# Patient Record
Sex: MALE | Race: WHITE | NOT HISPANIC OR LATINO
[De-identification: names, ages, dates, MRNs, and addresses within clinical notes are randomized per-mention and may not be internally consistent; named-entity substitution may affect disease eponyms.]

---

## 2017-02-28 ENCOUNTER — MEDICATION RENEWAL (OUTPATIENT)
Age: 42
End: 2017-02-28

## 2017-05-12 ENCOUNTER — MESSAGE (OUTPATIENT)
Age: 42
End: 2017-05-12

## 2017-06-06 ENCOUNTER — OTHER (OUTPATIENT)
Age: 42
End: 2017-06-06

## 2017-09-14 ENCOUNTER — APPOINTMENT (OUTPATIENT)
Dept: FAMILY MEDICINE | Facility: CLINIC | Age: 42
End: 2017-09-14
Payer: COMMERCIAL

## 2017-09-14 VITALS
BODY MASS INDEX: 31.22 KG/M2 | DIASTOLIC BLOOD PRESSURE: 80 MMHG | OXYGEN SATURATION: 98 % | RESPIRATION RATE: 16 BRPM | WEIGHT: 206 LBS | HEIGHT: 68 IN | SYSTOLIC BLOOD PRESSURE: 120 MMHG | HEART RATE: 87 BPM

## 2017-09-14 DIAGNOSIS — T81.89XA OTHER COMPLICATIONS OF PROCEDURES, NOT ELSEWHERE CLASSIFIED, INITIAL ENCOUNTER: ICD-10-CM

## 2017-09-14 DIAGNOSIS — M54.2 CERVICALGIA: ICD-10-CM

## 2017-09-14 DIAGNOSIS — Z18.9 OTHER COMPLICATIONS OF PROCEDURES, NOT ELSEWHERE CLASSIFIED, INITIAL ENCOUNTER: ICD-10-CM

## 2017-09-14 PROCEDURE — 90686 IIV4 VACC NO PRSV 0.5 ML IM: CPT

## 2017-09-14 PROCEDURE — G0008: CPT

## 2017-09-25 ENCOUNTER — RX RENEWAL (OUTPATIENT)
Age: 42
End: 2017-09-25

## 2018-02-26 ENCOUNTER — OTHER (OUTPATIENT)
Age: 43
End: 2018-02-26

## 2018-02-26 DIAGNOSIS — R11.0 NAUSEA: ICD-10-CM

## 2018-03-20 ENCOUNTER — APPOINTMENT (OUTPATIENT)
Dept: FAMILY MEDICINE | Facility: CLINIC | Age: 43
End: 2018-03-20
Payer: COMMERCIAL

## 2018-03-20 VITALS
WEIGHT: 207 LBS | SYSTOLIC BLOOD PRESSURE: 140 MMHG | BODY MASS INDEX: 31.37 KG/M2 | OXYGEN SATURATION: 98 % | RESPIRATION RATE: 16 BRPM | HEART RATE: 99 BPM | DIASTOLIC BLOOD PRESSURE: 80 MMHG | HEIGHT: 68 IN

## 2018-03-20 PROCEDURE — 99214 OFFICE O/P EST MOD 30 MIN: CPT

## 2018-07-30 ENCOUNTER — RX RENEWAL (OUTPATIENT)
Age: 43
End: 2018-07-30

## 2018-08-14 ENCOUNTER — MESSAGE (OUTPATIENT)
Age: 43
End: 2018-08-14

## 2018-08-17 ENCOUNTER — APPOINTMENT (OUTPATIENT)
Dept: CARDIOLOGY | Facility: CLINIC | Age: 43
End: 2018-08-17
Payer: COMMERCIAL

## 2018-08-17 ENCOUNTER — NON-APPOINTMENT (OUTPATIENT)
Age: 43
End: 2018-08-17

## 2018-08-17 VITALS
OXYGEN SATURATION: 98 % | HEIGHT: 68 IN | BODY MASS INDEX: 31.37 KG/M2 | DIASTOLIC BLOOD PRESSURE: 80 MMHG | SYSTOLIC BLOOD PRESSURE: 117 MMHG | HEART RATE: 89 BPM | WEIGHT: 207 LBS

## 2018-08-17 PROCEDURE — 93000 ELECTROCARDIOGRAM COMPLETE: CPT

## 2018-08-17 PROCEDURE — 99204 OFFICE O/P NEW MOD 45 MIN: CPT | Mod: 25

## 2018-08-20 ENCOUNTER — APPOINTMENT (OUTPATIENT)
Dept: FAMILY MEDICINE | Facility: CLINIC | Age: 43
End: 2018-08-20
Payer: COMMERCIAL

## 2018-08-20 VITALS
TEMPERATURE: 98 F | BODY MASS INDEX: 31.07 KG/M2 | DIASTOLIC BLOOD PRESSURE: 60 MMHG | SYSTOLIC BLOOD PRESSURE: 130 MMHG | HEART RATE: 98 BPM | HEIGHT: 68 IN | WEIGHT: 205 LBS | OXYGEN SATURATION: 98 %

## 2018-08-20 DIAGNOSIS — M54.6 PAIN IN THORACIC SPINE: ICD-10-CM

## 2018-08-20 PROCEDURE — 36415 COLL VENOUS BLD VENIPUNCTURE: CPT

## 2018-08-20 PROCEDURE — 99214 OFFICE O/P EST MOD 30 MIN: CPT | Mod: 25

## 2018-08-20 RX ORDER — ONDANSETRON 4 MG/1
4 TABLET, ORALLY DISINTEGRATING ORAL
Qty: 12 | Refills: 2 | Status: DISCONTINUED | COMMUNITY
Start: 2018-02-26 | End: 2018-08-20

## 2018-08-20 RX ORDER — ADALIMUMAB 40MG/0.8ML
40 KIT SUBCUTANEOUS
Qty: 2 | Refills: 0 | Status: ACTIVE | COMMUNITY
Start: 2018-06-14

## 2018-08-21 LAB
MEV IGG FLD QL IA: 23 AU/ML
MEV IGG+IGM SER-IMP: NEGATIVE
MUV AB SER-ACNC: NEGATIVE
MUV IGG SER QL IA: <5 AU/ML
RUBV IGG FLD-ACNC: >33 INDEX
RUBV IGG SER-IMP: POSITIVE

## 2018-08-24 ENCOUNTER — RX RENEWAL (OUTPATIENT)
Age: 43
End: 2018-08-24

## 2018-08-24 ENCOUNTER — MEDICATION RENEWAL (OUTPATIENT)
Age: 43
End: 2018-08-24

## 2018-08-29 PROBLEM — Z00.00 ENCOUNTER FOR PREVENTIVE HEALTH EXAMINATION: Noted: 2018-08-29

## 2018-09-11 ENCOUNTER — RECORD ABSTRACTING (OUTPATIENT)
Age: 43
End: 2018-09-11

## 2018-09-11 ENCOUNTER — APPOINTMENT (OUTPATIENT)
Dept: ENDOCRINOLOGY | Facility: CLINIC | Age: 43
End: 2018-09-11
Payer: COMMERCIAL

## 2018-09-11 ENCOUNTER — RX RENEWAL (OUTPATIENT)
Age: 43
End: 2018-09-11

## 2018-09-11 VITALS
DIASTOLIC BLOOD PRESSURE: 80 MMHG | HEART RATE: 84 BPM | BODY MASS INDEX: 31.37 KG/M2 | WEIGHT: 207 LBS | SYSTOLIC BLOOD PRESSURE: 130 MMHG | HEIGHT: 68 IN

## 2018-09-11 DIAGNOSIS — Z78.9 OTHER SPECIFIED HEALTH STATUS: ICD-10-CM

## 2018-09-11 PROBLEM — M54.6 ACUTE LEFT-SIDED THORACIC BACK PAIN: Status: ACTIVE | Noted: 2017-05-12

## 2018-09-11 LAB — GLUCOSE BLDC GLUCOMTR-MCNC: 188

## 2018-09-11 PROCEDURE — 99214 OFFICE O/P EST MOD 30 MIN: CPT | Mod: 25

## 2018-09-11 PROCEDURE — 82962 GLUCOSE BLOOD TEST: CPT

## 2018-09-13 ENCOUNTER — APPOINTMENT (OUTPATIENT)
Dept: ENDOCRINOLOGY | Facility: CLINIC | Age: 43
End: 2018-09-13

## 2018-09-14 ENCOUNTER — APPOINTMENT (OUTPATIENT)
Dept: CARDIOLOGY | Facility: CLINIC | Age: 43
End: 2018-09-14
Payer: COMMERCIAL

## 2018-09-14 ENCOUNTER — RX RENEWAL (OUTPATIENT)
Age: 43
End: 2018-09-14

## 2018-09-14 VITALS
WEIGHT: 207 LBS | BODY MASS INDEX: 31.37 KG/M2 | SYSTOLIC BLOOD PRESSURE: 140 MMHG | DIASTOLIC BLOOD PRESSURE: 90 MMHG | HEIGHT: 68 IN

## 2018-09-14 PROCEDURE — 99213 OFFICE O/P EST LOW 20 MIN: CPT | Mod: 25

## 2018-09-14 PROCEDURE — 93015 CV STRESS TEST SUPVJ I&R: CPT

## 2018-09-14 PROCEDURE — 93306 TTE W/DOPPLER COMPLETE: CPT

## 2018-09-25 ENCOUNTER — APPOINTMENT (OUTPATIENT)
Dept: FAMILY MEDICINE | Facility: CLINIC | Age: 43
End: 2018-09-25

## 2018-10-24 ENCOUNTER — APPOINTMENT (OUTPATIENT)
Dept: FAMILY MEDICINE | Facility: CLINIC | Age: 43
End: 2018-10-24
Payer: COMMERCIAL

## 2018-10-24 DIAGNOSIS — Z71.89 OTHER SPECIFIED COUNSELING: ICD-10-CM

## 2018-10-24 DIAGNOSIS — Z23 ENCOUNTER FOR IMMUNIZATION: ICD-10-CM

## 2018-10-24 PROCEDURE — 90472 IMMUNIZATION ADMIN EACH ADD: CPT

## 2018-10-24 PROCEDURE — G0008: CPT

## 2018-10-24 PROCEDURE — 90471 IMMUNIZATION ADMIN: CPT

## 2018-10-24 PROCEDURE — 99214 OFFICE O/P EST MOD 30 MIN: CPT | Mod: 25

## 2018-10-24 PROCEDURE — 90686 IIV4 VACC NO PRSV 0.5 ML IM: CPT

## 2018-10-24 PROCEDURE — 90707 MMR VACCINE SC: CPT

## 2018-10-28 VITALS
RESPIRATION RATE: 18 BRPM | OXYGEN SATURATION: 98 % | TEMPERATURE: 98 F | DIASTOLIC BLOOD PRESSURE: 84 MMHG | WEIGHT: 207 LBS | HEART RATE: 80 BPM | BODY MASS INDEX: 31.37 KG/M2 | SYSTOLIC BLOOD PRESSURE: 136 MMHG | HEIGHT: 68 IN

## 2018-10-28 PROBLEM — Z23 ENCOUNTER FOR VACCINATION: Status: ACTIVE | Noted: 2017-09-14

## 2018-10-28 PROBLEM — Z71.89 VACCINE COUNSELING: Status: ACTIVE | Noted: 2017-09-14

## 2018-11-08 ENCOUNTER — APPOINTMENT (OUTPATIENT)
Dept: OPHTHALMOLOGY | Facility: CLINIC | Age: 43
End: 2018-11-08
Payer: COMMERCIAL

## 2018-11-08 DIAGNOSIS — H01.02B SQUAMOUS BLEPHARITIS RIGHT EYE, UPPER AND LOWER EYELIDS: ICD-10-CM

## 2018-11-08 DIAGNOSIS — H01.02A SQUAMOUS BLEPHARITIS RIGHT EYE, UPPER AND LOWER EYELIDS: ICD-10-CM

## 2018-11-08 PROCEDURE — 92004 COMPRE OPH EXAM NEW PT 1/>: CPT

## 2019-01-08 ENCOUNTER — RECORD ABSTRACTING (OUTPATIENT)
Age: 44
End: 2019-01-08

## 2019-01-08 DIAGNOSIS — Z78.9 OTHER SPECIFIED HEALTH STATUS: ICD-10-CM

## 2019-01-16 ENCOUNTER — APPOINTMENT (OUTPATIENT)
Dept: ENDOCRINOLOGY | Facility: CLINIC | Age: 44
End: 2019-01-16
Payer: COMMERCIAL

## 2019-01-16 LAB — GLUCOSE BLDC GLUCOMTR-MCNC: 168

## 2019-01-16 PROCEDURE — 82962 GLUCOSE BLOOD TEST: CPT

## 2019-01-16 PROCEDURE — 99213 OFFICE O/P EST LOW 20 MIN: CPT | Mod: 25

## 2019-01-16 RX ORDER — BLOOD SUGAR DIAGNOSTIC
STRIP MISCELLANEOUS 4 TIMES DAILY
Refills: 0 | Status: DISCONTINUED | COMMUNITY
End: 2019-01-16

## 2019-01-16 RX ORDER — EPINEPHRINE 0.3 MG/.3ML
0.3 INJECTION INTRAMUSCULAR
Qty: 1 | Refills: 5 | Status: DISCONTINUED | COMMUNITY
Start: 2018-03-20 | End: 2019-01-16

## 2019-01-16 RX ORDER — GLIMEPIRIDE 2 MG/1
2 TABLET ORAL
Qty: 90 | Refills: 3 | Status: DISCONTINUED | COMMUNITY
End: 2019-01-16

## 2019-01-16 NOTE — ASSESSMENT
[FreeTextEntry1] : DM, evolving type 1, history consistent with TAMMY. Improving control by history. Requires verification by lab tests which are pending.

## 2019-01-16 NOTE — HISTORY OF PRESENT ILLNESS
[FreeTextEntry1] : DM type:TAMMY\par Severity:moderate\par Duration:10 years\par Onset:rapid weight loss and A1C of 13%\par \par Associated symptoms or complications:none\par \par Current regimen:\par metformin 1000 bid\par januvia 100\par glimepiride 2\par tresiba 12\par \par Pt. presented 10 years ago with rapid weight loss and an A1C level of 13%. Prior to this was told that his blood sugar was a little high, but no action was taken. he was started on kombiglyze with rapid glucose lowering and hypoglycemia; then placed on metformin. Control was good for years, but eventually he required the addition of januvia.\par During the past year diabetic control has declined, with A1C measured to a level of 10%; there has been coincident stress, and pt. has been less adherent with diet. \par A1C improved to 8.4% and now 7.8%. Strongly JOSÉ MANUEL positive, with low but not absent c-peptide. Responding to glimepiride.\par Started on tresiba 9/2018.\par \par PMH:\par psoriasis, on humira\par

## 2019-02-22 ENCOUNTER — NON-APPOINTMENT (OUTPATIENT)
Age: 44
End: 2019-02-22

## 2019-02-22 ENCOUNTER — LABORATORY RESULT (OUTPATIENT)
Age: 44
End: 2019-02-22

## 2019-02-22 ENCOUNTER — APPOINTMENT (OUTPATIENT)
Dept: FAMILY MEDICINE | Facility: CLINIC | Age: 44
End: 2019-02-22
Payer: COMMERCIAL

## 2019-02-22 VITALS
RESPIRATION RATE: 18 BRPM | BODY MASS INDEX: 31.37 KG/M2 | SYSTOLIC BLOOD PRESSURE: 144 MMHG | WEIGHT: 207 LBS | DIASTOLIC BLOOD PRESSURE: 84 MMHG | TEMPERATURE: 98 F | HEIGHT: 68 IN | HEART RATE: 78 BPM | OXYGEN SATURATION: 98 %

## 2019-02-22 PROCEDURE — 99214 OFFICE O/P EST MOD 30 MIN: CPT | Mod: 25

## 2019-02-22 PROCEDURE — 93000 ELECTROCARDIOGRAM COMPLETE: CPT

## 2019-02-22 PROCEDURE — 36415 COLL VENOUS BLD VENIPUNCTURE: CPT

## 2019-03-08 LAB
25(OH)D3 SERPL-MCNC: 30.6 NG/ML
ALBUMIN SERPL ELPH-MCNC: 4.8 G/DL
ALP BLD-CCNC: 40 U/L
ALT SERPL-CCNC: 25 U/L
AMYLASE/CREAT SERPL: 36 U/L
ANION GAP SERPL CALC-SCNC: 14 MMOL/L
APPEARANCE: CLEAR
AST SERPL-CCNC: 16 U/L
BASOPHILS # BLD AUTO: 0.02 K/UL
BASOPHILS NFR BLD AUTO: 0.3 %
BILIRUB SERPL-MCNC: 0.3 MG/DL
BILIRUBIN URINE: NEGATIVE
BLOOD URINE: NEGATIVE
BUN SERPL-MCNC: 13 MG/DL
CALCIUM SERPL-MCNC: 9.5 MG/DL
CHLORIDE SERPL-SCNC: 99 MMOL/L
CHOLEST SERPL-MCNC: 156 MG/DL
CHOLEST/HDLC SERPL: 3.4 RATIO
CLAM IGE QN: <0.1 KUA/L
CO2 SERPL-SCNC: 24 MMOL/L
CODFISH IGE QN: <0.1 KUA/L
COLOR: NORMAL
CORN IGE QN: <0.1 KUA/L
COW MILK IGE QN: <0.1 KUA/L
CREAT SERPL-MCNC: 0.76 MG/DL
CREAT SPEC-SCNC: 66 MG/DL
DEPRECATED CLAM IGE RAST QL: 0
DEPRECATED CODFISH IGE RAST QL: 0
DEPRECATED CORN IGE RAST QL: 0
DEPRECATED COW MILK IGE RAST QL: 0
DEPRECATED EGG WHITE IGE RAST QL: NORMAL
DEPRECATED GLUTEN IGE RAST QL: <0.1 KUA/L
DEPRECATED PEANUT IGE RAST QL: NORMAL
DEPRECATED SCALLOP IGE RAST QL: <0.1 KUA/L
DEPRECATED SESAME SEED IGE RAST QL: 0
DEPRECATED SHRIMP IGE RAST QL: NORMAL
DEPRECATED SOYBEAN IGE RAST QL: 0
DEPRECATED WALNUT IGE RAST QL: 0
DEPRECATED WHEAT IGE RAST QL: 0
EGG WHITE IGE QN: 0.19 KUA/L
EOSINOPHIL # BLD AUTO: 0.14 K/UL
EOSINOPHIL NFR BLD AUTO: 2.4 %
FERRITIN SERPL-MCNC: 151 NG/ML
FOLATE SERPL-MCNC: 11.7 NG/ML
GLUCOSE QUALITATIVE U: ABNORMAL
GLUCOSE SERPL-MCNC: 314 MG/DL
GLUTEN IGG QN: 0
HBA1C MFR BLD HPLC: 8.1 %
HCT VFR BLD CALC: 46.5 %
HDLC SERPL-MCNC: 46 MG/DL
HGB BLD-MCNC: 15.4 G/DL
IMM GRANULOCYTES NFR BLD AUTO: 0.2 %
KETONES URINE: NEGATIVE
LDLC SERPL CALC-MCNC: 82 MG/DL
LEUKOCYTE ESTERASE URINE: NEGATIVE
LPL SERPL-CCNC: 43 U/L
LYMPHOCYTES # BLD AUTO: 1.94 K/UL
LYMPHOCYTES NFR BLD AUTO: 33.3 %
MAN DIFF?: NORMAL
MCHC RBC-ENTMCNC: 28.7 PG
MCHC RBC-ENTMCNC: 33.1 GM/DL
MCV RBC AUTO: 86.6 FL
MICROALBUMIN 24H UR DL<=1MG/L-MCNC: <1.2 MG/DL
MICROALBUMIN/CREAT 24H UR-RTO: NORMAL MG/G
MONOCYTES # BLD AUTO: 0.55 K/UL
MONOCYTES NFR BLD AUTO: 9.4 %
NEUTROPHILS # BLD AUTO: 3.17 K/UL
NEUTROPHILS NFR BLD AUTO: 54.4 %
NITRITE URINE: NEGATIVE
PEANUT IGE QN: 0.17 KUA/L
PH URINE: 6
PLATELET # BLD AUTO: 239 K/UL
POTASSIUM SERPL-SCNC: 4.4 MMOL/L
PROT SERPL-MCNC: 7.1 G/DL
PROTEIN URINE: NEGATIVE
PSA FREE FLD-MCNC: 22 %
PSA FREE SERPL-MCNC: 0.17 NG/ML
PSA SERPL-MCNC: 0.75 NG/ML
RBC # BLD: 5.37 M/UL
RBC # FLD: 12.5 %
SCALLOP IGE QN: 0
SCALLOP IGE QN: 0.23 KUA/L
SESAME SEED IGE QN: <0.1 KUA/L
SODIUM SERPL-SCNC: 137 MMOL/L
SOYBEAN IGE QN: <0.1 KUA/L
SPECIFIC GRAVITY URINE: 1.02
T3RU NFR SERPL: 0.9 TBI
T4 FREE SERPL-MCNC: 1.5 NG/DL
TOTAL IGE SMQN RAST: 421 KU/L
TRIGL SERPL-MCNC: 141 MG/DL
TSH SERPL-ACNC: 1.23 UIU/ML
TTG IGA SER IA-ACNC: <1.2 U/ML
TTG IGA SER-ACNC: NEGATIVE
TTG IGG SER IA-ACNC: <1.2 U/ML
TTG IGG SER IA-ACNC: NEGATIVE
UROBILINOGEN URINE: NORMAL
VIT B12 SERPL-MCNC: 468 PG/ML
WALNUT IGE QN: <0.1 KUA/L
WBC # FLD AUTO: 5.83 K/UL
WHEAT IGE QN: <0.1 KUA/L

## 2019-04-26 ENCOUNTER — APPOINTMENT (OUTPATIENT)
Dept: FAMILY MEDICINE | Facility: CLINIC | Age: 44
End: 2019-04-26
Payer: COMMERCIAL

## 2019-04-26 VITALS
RESPIRATION RATE: 16 BRPM | SYSTOLIC BLOOD PRESSURE: 124 MMHG | OXYGEN SATURATION: 96 % | BODY MASS INDEX: 34.56 KG/M2 | WEIGHT: 228 LBS | TEMPERATURE: 99 F | DIASTOLIC BLOOD PRESSURE: 84 MMHG | HEIGHT: 68 IN | HEART RATE: 85 BPM

## 2019-04-26 LAB
FLUAV SPEC QL CULT: NEGATIVE
FLUBV AG SPEC QL IA: NEGATIVE
RESULT: NEGATIVE

## 2019-04-26 PROCEDURE — 87804 INFLUENZA ASSAY W/OPTIC: CPT | Mod: QW

## 2019-04-26 PROCEDURE — 99213 OFFICE O/P EST LOW 20 MIN: CPT | Mod: 25

## 2019-04-26 PROCEDURE — 87880 STREP A ASSAY W/OPTIC: CPT | Mod: QW

## 2019-04-26 RX ORDER — FLUTICASONE PROPIONATE 50 UG/1
50 SPRAY, METERED NASAL DAILY
Qty: 1 | Refills: 1 | Status: ACTIVE | COMMUNITY
Start: 2019-04-26 | End: 1900-01-01

## 2019-04-26 NOTE — PHYSICAL EXAM
[No Acute Distress] : no acute distress [Well Developed] : well developed [Well Nourished] : well nourished [Normal Outer Ear/Nose] : the outer ears and nose were normal in appearance [Normal Sclera/Conjunctiva] : normal sclera/conjunctiva [Well-Appearing] : well-appearing [Normal Oropharynx] : the oropharynx was normal [No Respiratory Distress] : no respiratory distress  [Clear to Auscultation] : lungs were clear to auscultation bilaterally [No Accessory Muscle Use] : no accessory muscle use [Regular Rhythm] : with a regular rhythm [Normal Rate] : normal rate  [Normal S1, S2] : normal S1 and S2 [Normal Posterior Cervical Nodes] : no posterior cervical lymphadenopathy [Normal Anterior Cervical Nodes] : no anterior cervical lymphadenopathy [No CVA Tenderness] : no CVA  tenderness [No Joint Swelling] : no joint swelling [No Spinal Tenderness] : no spinal tenderness [Grossly Normal Strength/Tone] : grossly normal strength/tone [No Rash] : no rash [Normal Gait] : normal gait [Coordination Grossly Intact] : coordination grossly intact [No Focal Deficits] : no focal deficits [Normal Insight/Judgement] : insight and judgment were intact [Normal Affect] : the affect was normal

## 2019-04-26 NOTE — REVIEW OF SYSTEMS
[Earache] : earache [Sore Throat] : sore throat [Postnasal Drip] : postnasal drip [Nasal Discharge] : nasal discharge [Shortness Of Breath] : shortness of breath [Cough] : cough [Wheezing] : wheezing [Dyspnea on Exertion] : dyspnea on exertion [Negative] : Psychiatric

## 2019-04-29 NOTE — HEALTH RISK ASSESSMENT
[No falls in past year] : Patient reported no falls in the past year [] : No [de-identified] : none  [de-identified] : none  [de-identified] : socailly  [de-identified] : exercises daily  [de-identified] : carbd

## 2019-04-29 NOTE — PLAN
[FreeTextEntry1] : Start antibiotics \par Use nasal spray daily \par Take Motrin and Tylenol as needed for pain and fever \par Rest and Increase fluid intake \par Followup consult with a allergist for possible allergy testing for report of chronic rhinitis \par Call back if symptoms worsen or don’t improve after completing antibiotics\par

## 2019-04-29 NOTE — HISTORY OF PRESENT ILLNESS
[FreeTextEntry8] : GHANSHYAM MIGUEL is a 43 year old male who presents with for the past 1 week coughing and congestion. \par Has been taking Zyrtec and cough medicine. The cough is productive with greenish sputum. Denies shortness of breath.denies fevers \par

## 2019-05-17 ENCOUNTER — APPOINTMENT (OUTPATIENT)
Dept: FAMILY MEDICINE | Facility: CLINIC | Age: 44
End: 2019-05-17
Payer: COMMERCIAL

## 2019-05-17 VITALS
RESPIRATION RATE: 18 BRPM | HEIGHT: 68 IN | OXYGEN SATURATION: 97 % | DIASTOLIC BLOOD PRESSURE: 83 MMHG | SYSTOLIC BLOOD PRESSURE: 129 MMHG | BODY MASS INDEX: 32.58 KG/M2 | WEIGHT: 215 LBS | HEART RATE: 77 BPM | TEMPERATURE: 98 F

## 2019-05-17 PROCEDURE — 99213 OFFICE O/P EST LOW 20 MIN: CPT

## 2019-05-17 RX ORDER — CLARITHROMYCIN 500 MG/1
500 TABLET, FILM COATED ORAL
Qty: 20 | Refills: 0 | Status: DISCONTINUED | COMMUNITY
Start: 2019-04-26 | End: 2019-05-17

## 2019-05-17 NOTE — PHYSICAL EXAM
[No Acute Distress] : no acute distress [Well Nourished] : well nourished [Well Developed] : well developed [Normal Sclera/Conjunctiva] : normal sclera/conjunctiva [Normal Outer Ear/Nose] : the outer ears and nose were normal in appearance [No Respiratory Distress] : no respiratory distress  [Normal Oropharynx] : the oropharynx was normal [No Accessory Muscle Use] : no accessory muscle use [Clear to Auscultation] : lungs were clear to auscultation bilaterally [Normal Rate] : normal rate  [Normal S1, S2] : normal S1 and S2 [Regular Rhythm] : with a regular rhythm [No CVA Tenderness] : no CVA  tenderness [No Joint Swelling] : no joint swelling [No Rash] : no rash [Coordination Grossly Intact] : coordination grossly intact [Normal Gait] : normal gait [Normal Affect] : the affect was normal [No Focal Deficits] : no focal deficits [Normal Insight/Judgement] : insight and judgment were intact [de-identified] : right ear injected, ear canal puss noted. left ear Tm is normal

## 2019-05-17 NOTE — HISTORY OF PRESENT ILLNESS
[FreeTextEntry8] : GHANSHYAM MIGUEL is a 43 year old male who presents with c/o of right ear pain for the past 1 week. He traveled by airplane last week. He denies fevers, running nose or cough. \par

## 2019-05-17 NOTE — PLAN
[FreeTextEntry1] : We spent sufficient time to discuss aspects of care; questions were answered  to patient's satisfaction.The diagnosis and care plan were discussed with patient in detail.  Patient test results were  reviewed and explained in full. All questions were answered.\par \par

## 2019-05-20 ENCOUNTER — APPOINTMENT (OUTPATIENT)
Dept: OPHTHALMOLOGY | Facility: CLINIC | Age: 44
End: 2019-05-20

## 2019-05-23 ENCOUNTER — APPOINTMENT (OUTPATIENT)
Dept: ENDOCRINOLOGY | Facility: CLINIC | Age: 44
End: 2019-05-23
Payer: COMMERCIAL

## 2019-05-23 VITALS
DIASTOLIC BLOOD PRESSURE: 74 MMHG | OXYGEN SATURATION: 97 % | HEIGHT: 68 IN | HEART RATE: 88 BPM | SYSTOLIC BLOOD PRESSURE: 118 MMHG

## 2019-05-23 LAB — GLUCOSE BLDC GLUCOMTR-MCNC: 109

## 2019-05-23 PROCEDURE — 82962 GLUCOSE BLOOD TEST: CPT

## 2019-05-23 PROCEDURE — 99213 OFFICE O/P EST LOW 20 MIN: CPT | Mod: 25

## 2019-05-23 NOTE — ASSESSMENT
[FreeTextEntry1] : DM, evolving type 1, history consistent with TAMMY. Steady improvement, can change further with weight loss. No need for additional drug therapy

## 2019-08-13 ENCOUNTER — RX RENEWAL (OUTPATIENT)
Age: 44
End: 2019-08-13

## 2019-09-27 ENCOUNTER — APPOINTMENT (OUTPATIENT)
Dept: ENDOCRINOLOGY | Facility: CLINIC | Age: 44
End: 2019-09-27

## 2019-10-02 LAB
HBA1C MFR BLD HPLC: 6.2
LDLC SERPL DIRECT ASSAY-MCNC: 101
MICROALBUMIN/CREAT 24H UR-RTO: 3

## 2019-10-03 ENCOUNTER — APPOINTMENT (OUTPATIENT)
Dept: ENDOCRINOLOGY | Facility: CLINIC | Age: 44
End: 2019-10-03
Payer: COMMERCIAL

## 2019-10-03 VITALS
DIASTOLIC BLOOD PRESSURE: 80 MMHG | WEIGHT: 215 LBS | HEART RATE: 83 BPM | HEIGHT: 68 IN | BODY MASS INDEX: 32.58 KG/M2 | SYSTOLIC BLOOD PRESSURE: 130 MMHG

## 2019-10-03 LAB — GLUCOSE BLDC GLUCOMTR-MCNC: 102

## 2019-10-03 PROCEDURE — 82962 GLUCOSE BLOOD TEST: CPT

## 2019-10-03 PROCEDURE — 99213 OFFICE O/P EST LOW 20 MIN: CPT | Mod: 25

## 2019-10-03 RX ORDER — BLOOD SUGAR DIAGNOSTIC
STRIP MISCELLANEOUS
Qty: 400 | Refills: 3 | Status: ACTIVE | COMMUNITY
Start: 2018-09-11 | End: 1900-01-01

## 2019-10-03 RX ORDER — SULFAMETHOXAZOLE AND TRIMETHOPRIM 800; 160 MG/1; MG/1
800-160 TABLET ORAL
Qty: 14 | Refills: 0 | Status: DISCONTINUED | COMMUNITY
Start: 2019-05-17 | End: 2019-10-03

## 2019-10-03 RX ORDER — NEOMYCIN SULFATE, POLYMYXIN B SULFATE, HYDROCORTISONE 3.5; 10000; 1 MG/ML; [USP'U]/ML; MG/ML
1 SOLUTION/ DROPS AURICULAR (OTIC) 4 TIMES DAILY
Qty: 1 | Refills: 0 | Status: DISCONTINUED | COMMUNITY
Start: 2019-05-17 | End: 2019-10-03

## 2019-10-03 NOTE — ASSESSMENT
[FreeTextEntry1] : DM, evolving type 1, history consistent with TAMMY. Steady improvement, doing very well with low carb diet. Discussed insulin titration; I would like him to remain on at least some insulin

## 2020-01-29 ENCOUNTER — APPOINTMENT (OUTPATIENT)
Dept: FAMILY MEDICINE | Facility: CLINIC | Age: 45
End: 2020-01-29
Payer: COMMERCIAL

## 2020-01-29 VITALS
SYSTOLIC BLOOD PRESSURE: 145 MMHG | HEART RATE: 82 BPM | HEIGHT: 68 IN | BODY MASS INDEX: 32.58 KG/M2 | TEMPERATURE: 98.6 F | WEIGHT: 215 LBS | DIASTOLIC BLOOD PRESSURE: 93 MMHG | OXYGEN SATURATION: 97 % | RESPIRATION RATE: 16 BRPM

## 2020-01-29 DIAGNOSIS — J18.0 BRONCHOPNEUMONIA, UNSPECIFIED ORGANISM: ICD-10-CM

## 2020-01-29 DIAGNOSIS — R05 COUGH: ICD-10-CM

## 2020-01-29 DIAGNOSIS — R07.89 OTHER CHEST PAIN: ICD-10-CM

## 2020-01-29 DIAGNOSIS — H92.01 OTALGIA, RIGHT EAR: ICD-10-CM

## 2020-01-29 DIAGNOSIS — R10.9 UNSPECIFIED ABDOMINAL PAIN: ICD-10-CM

## 2020-01-29 DIAGNOSIS — H66.91 OTITIS MEDIA, UNSPECIFIED, RIGHT EAR: ICD-10-CM

## 2020-01-29 DIAGNOSIS — H65.01 ACUTE SEROUS OTITIS MEDIA, RIGHT EAR: ICD-10-CM

## 2020-01-29 LAB
FLUAV SPEC QL CULT: NEGATIVE
FLUBV AG SPEC QL IA: NEGATIVE
S PYO AG SPEC QL IA: NEGATIVE

## 2020-01-29 PROCEDURE — 87880 STREP A ASSAY W/OPTIC: CPT | Mod: QW

## 2020-01-29 PROCEDURE — 99214 OFFICE O/P EST MOD 30 MIN: CPT | Mod: 25

## 2020-01-29 PROCEDURE — 87804 INFLUENZA ASSAY W/OPTIC: CPT | Mod: QW

## 2020-01-29 RX ORDER — GUAIFENESIN 600 MG/1
600 TABLET, EXTENDED RELEASE ORAL TWICE DAILY
Qty: 28 | Refills: 0 | Status: DISCONTINUED | COMMUNITY
Start: 2019-04-26 | End: 2020-01-29

## 2020-02-27 ENCOUNTER — APPOINTMENT (OUTPATIENT)
Dept: ENDOCRINOLOGY | Facility: CLINIC | Age: 45
End: 2020-02-27
Payer: COMMERCIAL

## 2020-02-27 VITALS
BODY MASS INDEX: 32.58 KG/M2 | SYSTOLIC BLOOD PRESSURE: 118 MMHG | HEIGHT: 68 IN | DIASTOLIC BLOOD PRESSURE: 70 MMHG | HEART RATE: 74 BPM | WEIGHT: 215 LBS

## 2020-02-27 LAB
GLUCOSE BLDC GLUCOMTR-MCNC: 163
HBA1C MFR BLD HPLC: 7.5
LDLC SERPL DIRECT ASSAY-MCNC: 94
MICROALBUMIN/CREAT UR-RTO: NORMAL

## 2020-02-27 PROCEDURE — 99213 OFFICE O/P EST LOW 20 MIN: CPT | Mod: 25

## 2020-02-27 PROCEDURE — 82962 GLUCOSE BLOOD TEST: CPT

## 2020-02-27 NOTE — HISTORY OF PRESENT ILLNESS
[FreeTextEntry1] : DM type:TAMMY\par Severity:moderate\par Duration:11 years\par Onset:rapid weight loss and A1C of 13%\par \par Associated symptoms or complications:none\par \par Current regimen:\par metformin 1000 bid\par januvia 100\par glimepiride 2\par tresiba 12\par \par Pt. presented 10 years ago with rapid weight loss and an A1C level of 13%. Prior to this was told that his blood sugar was a little high, but no action was taken. he was started on kombiglyze with rapid glucose lowering and hypoglycemia; then placed on metformin. Control was good for years, but eventually he required the addition of januvia.\par During the past year diabetic control has declined, with A1C measured to a level of 10%; there has been coincident stress, and pt. has been less adherent with diet. \par A1C improved to 8.4% and now 7.8%. Strongly JOSÉ MANUEL positive, with low but not absent c-peptide. Responding to glimepiride.\par Started on tresiba 9/2018.\par \par PMH:\par psoriasis, on humira\par

## 2020-03-02 ENCOUNTER — RX RENEWAL (OUTPATIENT)
Age: 45
End: 2020-03-02

## 2020-04-07 ENCOUNTER — APPOINTMENT (OUTPATIENT)
Dept: FAMILY MEDICINE | Facility: CLINIC | Age: 45
End: 2020-04-07
Payer: COMMERCIAL

## 2020-04-07 DIAGNOSIS — J06.9 ACUTE UPPER RESPIRATORY INFECTION, UNSPECIFIED: ICD-10-CM

## 2020-04-07 DIAGNOSIS — K21.9 GASTRO-ESOPHAGEAL REFLUX DISEASE W/OUT ESOPHAGITIS: ICD-10-CM

## 2020-04-07 PROCEDURE — 99213 OFFICE O/P EST LOW 20 MIN: CPT | Mod: 95

## 2020-04-30 ENCOUNTER — RX RENEWAL (OUTPATIENT)
Age: 45
End: 2020-04-30

## 2020-08-12 LAB
HBA1C MFR BLD HPLC: 7.5
LDLC SERPL DIRECT ASSAY-MCNC: 112
MICROALBUMIN/CREAT 24H UR-RTO: NORMAL

## 2020-08-13 ENCOUNTER — APPOINTMENT (OUTPATIENT)
Dept: ENDOCRINOLOGY | Facility: CLINIC | Age: 45
End: 2020-08-13
Payer: COMMERCIAL

## 2020-08-13 ENCOUNTER — RX RENEWAL (OUTPATIENT)
Age: 45
End: 2020-08-13

## 2020-08-13 VITALS
DIASTOLIC BLOOD PRESSURE: 70 MMHG | SYSTOLIC BLOOD PRESSURE: 110 MMHG | HEIGHT: 68 IN | BODY MASS INDEX: 31.83 KG/M2 | WEIGHT: 210 LBS | HEART RATE: 88 BPM

## 2020-08-13 LAB — GLUCOSE BLDC GLUCOMTR-MCNC: 161

## 2020-08-13 PROCEDURE — 99214 OFFICE O/P EST MOD 30 MIN: CPT | Mod: 25

## 2020-08-13 PROCEDURE — 82962 GLUCOSE BLOOD TEST: CPT

## 2020-08-13 RX ORDER — AMOXICILLIN AND CLAVULANATE POTASSIUM 500; 125 MG/1; MG/1
500-125 TABLET, FILM COATED ORAL
Qty: 20 | Refills: 0 | Status: DISCONTINUED | COMMUNITY
Start: 2019-02-22 | End: 2020-08-13

## 2020-08-13 NOTE — ASSESSMENT
[FreeTextEntry1] : DM, evolving type 1, history consistent with TAMMY. Controlled, but with room for improvement with lifestyle changes\par Mild LDL elevation, to be observed

## 2020-08-26 ENCOUNTER — RX RENEWAL (OUTPATIENT)
Age: 45
End: 2020-08-26

## 2020-09-25 ENCOUNTER — RX RENEWAL (OUTPATIENT)
Age: 45
End: 2020-09-25

## 2020-10-05 ENCOUNTER — RX RENEWAL (OUTPATIENT)
Age: 45
End: 2020-10-05

## 2020-10-05 RX ORDER — LANCETS 28 GAUGE
EACH MISCELLANEOUS
Qty: 400 | Refills: 3 | Status: ACTIVE | COMMUNITY
Start: 2020-10-05 | End: 1900-01-01

## 2020-10-19 ENCOUNTER — RX RENEWAL (OUTPATIENT)
Age: 45
End: 2020-10-19

## 2020-10-27 ENCOUNTER — RX RENEWAL (OUTPATIENT)
Age: 45
End: 2020-10-27

## 2020-12-10 LAB
HBA1C MFR BLD HPLC: 8.3
LDLC SERPL DIRECT ASSAY-MCNC: 109
MICROALBUMIN/CREAT 24H UR-RTO: 119

## 2020-12-11 ENCOUNTER — APPOINTMENT (OUTPATIENT)
Dept: ENDOCRINOLOGY | Facility: CLINIC | Age: 45
End: 2020-12-11

## 2020-12-21 ENCOUNTER — APPOINTMENT (OUTPATIENT)
Dept: ENDOCRINOLOGY | Facility: CLINIC | Age: 45
End: 2020-12-21
Payer: COMMERCIAL

## 2020-12-21 VITALS
HEART RATE: 100 BPM | BODY MASS INDEX: 32.43 KG/M2 | DIASTOLIC BLOOD PRESSURE: 82 MMHG | WEIGHT: 214 LBS | HEIGHT: 68 IN | SYSTOLIC BLOOD PRESSURE: 124 MMHG

## 2020-12-21 LAB — GLUCOSE BLDC GLUCOMTR-MCNC: 187

## 2020-12-21 PROCEDURE — 99214 OFFICE O/P EST MOD 30 MIN: CPT | Mod: 25

## 2020-12-21 PROCEDURE — 99072 ADDL SUPL MATRL&STAF TM PHE: CPT

## 2020-12-21 PROCEDURE — 82962 GLUCOSE BLOOD TEST: CPT

## 2020-12-21 RX ORDER — ADALIMUMAB 40MG/0.4ML
40 KIT SUBCUTANEOUS
Qty: 2 | Refills: 0 | Status: ACTIVE | COMMUNITY
Start: 2020-11-30

## 2020-12-21 NOTE — PHYSICAL EXAM
[Alert] : alert [Well Nourished] : well nourished [No Acute Distress] : no acute distress [Well Developed] : well developed [Normal Sclera/Conjunctiva] : normal sclera/conjunctiva [EOMI] : extra ocular movement intact [No LAD] : no lymphadenopathy [Thyroid Not Enlarged] : the thyroid was not enlarged [No Thyroid Nodules] : no palpable thyroid nodules [No Respiratory Distress] : no respiratory distress [Normal Rate and Effort] : normal respiratory rate and effort [Clear to Auscultation] : lungs were clear to auscultation bilaterally [Normal S1, S2] : normal S1 and S2 [Normal Rate] : heart rate was normal [Regular Rhythm] : with a regular rhythm [No Edema] : no peripheral edema [Pedal Pulses Normal] : the pedal pulses are present [Normal Bowel Sounds] : normal bowel sounds [Not Tender] : non-tender [Soft] : abdomen soft [No Stigmata of Cushings Syndrome] : no stigmata of Cushings Syndrome [Normal Gait] : normal gait [No Rash] : no rash [Acanthosis Nigricans] : no acanthosis nigricans [Foot Ulcers] : no foot ulcers [Right Foot Was Examined] : right foot ~C was examined [Left Foot Was Examined] : left foot ~C was examined [Normal] : normal [Full ROM] : with full range of motion [Diminished Throughout Both Feet] : normal tactile sensation with monofilament testing throughout both feet [No Motor Deficits] : the motor exam was normal [No Tremors] : no tremors [Oriented x3] : oriented to person, place, and time [Normal Insight/Judgement] : insight and judgment were intact [Normal Mood] : the mood was normal

## 2020-12-21 NOTE — ASSESSMENT
[FreeTextEntry1] : 46 y/o male with TAMMY and Elevated LDL. \par \par Plan: \par TAMMY evolving Type 1: glycemic control worse. pt. declined increase medication at this time, he wants to work on diet and exercise to lower A1C\par - bring in meter to next office visit\par - educated on healthy good choices - avoid sweets \par - encouraged to increase routine exercise \par - repeat A1C in 3 months \par \par Elevated LDL: follow a low fat diet and increase exercise\par - repeat lipids in 3 months \par \par RTO in 3 months  [Importance of Diet and Exercise] : importance of diet and exercise to improve glycemic control, achieve weight loss and improve cardiovascular health

## 2020-12-21 NOTE — HISTORY OF PRESENT ILLNESS
[FreeTextEntry1] : DM type:TAMMY\par  Severity:moderate\par  Duration:11 years \par Onset:rapid weight loss and A1C of 13%  \par Associated symptoms or complications:none\par  \par  Current regimen: metformin 1000 bid\par  januvia 100\par  glimepiride 2 mg\par   tresiba 12 units at [facility]\par  \par \par Blood sugar monitorin-5 times a day,\par  fasting \par 2 hours after meals: 170-180\par \par exercise: elliptical at house, use to be a runner, weight training 3 days \par \par diet:  Pt. reports he has not been following a low carb diet causing A1C to rise\par morning in protein, fish\par afternoon varied \par dinner meat and vegetables\par deserts at times  \par \par Pt. presented 10 years ago with rapid weight loss and an A1C level of 13%. Prior to this was told that his blood sugar was a little high, but no action was taken. he was started on kombiglyze with rapid glucose lowering and hypoglycemia; then placed on metformin. Control was good for years, but eventually he required the addition of januvia. During the past year diabetic control has declined, with A1C measured to a level of 10%; there has been coincident stress, and pt. has been less adherent with diet.  A1C improved to 8.4% and now 7.8%. Strongly JOSÉ MANUEL positive, with low but not absent c-peptide. Responding to glimepiride. Started on tresiba 2018.\par \par   PMH: psoriasis, on humira

## 2020-12-21 NOTE — REVIEW OF SYSTEMS
[Fatigue] : no fatigue [Decreased Appetite] : appetite not decreased [Recent Weight Gain (___ Lbs)] : no recent weight gain [Recent Weight Loss (___ Lbs)] : no recent weight loss [Visual Field Defect] : no visual field defect [Blurred Vision] : no blurred vision [Dysphagia] : no dysphagia [Neck Pain] : no neck pain [Dysphonia] : no dysphonia [Chest Pain] : no chest pain [Palpitations] : no palpitations [Constipation] : no constipation [Diarrhea] : no diarrhea [Polyuria] : no polyuria [Dysuria] : no dysuria [Headaches] : no headaches [Tremors] : no tremors [Depression] : no depression [Anxiety] : no anxiety [Polydipsia] : no polydipsia [Swelling] : no swelling [FreeTextEntry2] : weight stable  [de-identified] : with high blood sugars, currently no polydipsia

## 2020-12-23 PROBLEM — J06.9 ACUTE URI: Status: RESOLVED | Noted: 2020-01-29 | Resolved: 2020-12-23

## 2020-12-23 PROBLEM — H66.91 OTITIS MEDIA, RIGHT: Status: RESOLVED | Noted: 2019-04-26 | Resolved: 2020-12-23

## 2021-04-05 ENCOUNTER — APPOINTMENT (OUTPATIENT)
Dept: ENDOCRINOLOGY | Facility: CLINIC | Age: 46
End: 2021-04-05
Payer: COMMERCIAL

## 2021-04-05 VITALS
BODY MASS INDEX: 30.77 KG/M2 | DIASTOLIC BLOOD PRESSURE: 80 MMHG | HEART RATE: 80 BPM | OXYGEN SATURATION: 99 % | SYSTOLIC BLOOD PRESSURE: 126 MMHG | WEIGHT: 203 LBS | HEIGHT: 68 IN

## 2021-04-05 LAB — GLUCOSE BLDC GLUCOMTR-MCNC: 123

## 2021-04-05 PROCEDURE — 82962 GLUCOSE BLOOD TEST: CPT

## 2021-04-05 PROCEDURE — 99072 ADDL SUPL MATRL&STAF TM PHE: CPT

## 2021-04-05 PROCEDURE — 99214 OFFICE O/P EST MOD 30 MIN: CPT | Mod: 25

## 2021-04-05 NOTE — ASSESSMENT
[FreeTextEntry1] : DM, improving control with episodes of nocturnal hypoglycemia. Decrease tresiba to 10 units.\par Improving LDL with diet changes

## 2021-04-05 NOTE — PHYSICAL EXAM
[Thyroid Not Enlarged] : the thyroid was not enlarged [Right Foot Was Examined] : right foot ~C was examined [Left Foot Was Examined] : left foot ~C was examined

## 2021-04-05 NOTE — HISTORY OF PRESENT ILLNESS
[FreeTextEntry1] : DM type:TAMMY\par  Severity:moderate\par  Duration:12 years \par Onset:rapid weight loss and A1C of 13%  \par Associated symptoms or complications:none\par  \par  Current regimen: metformin 1000 bid\par  januvia 100\par  glimepiride 2 mg\par   tresiba 12 units at [facility]\par  \par \par Blood sugar monitorin-5 times a day,\par  fasting \par 2 hours after meals: 170-180\par \par exercise: elliptical at house, use to be a runner, weight training 3 days \par \par diet:  Pt. reports he has not been following a low carb diet causing A1C to rise\par morning in protein, fish\par afternoon varied \par dinner meat and vegetables\par deserts at times  \par \par Pt. presented 10 years ago with rapid weight loss and an A1C level of 13%. Prior to this was told that his blood sugar was a little high, but no action was taken. he was started on kombiglyze with rapid glucose lowering and hypoglycemia; then placed on metformin. Control was good for years, but eventually he required the addition of januvia. During the past year diabetic control has declined, with A1C measured to a level of 10%; there has been coincident stress, and pt. has been less adherent with diet.  A1C improved to 8.4% and now 7.8%. Strongly JOSÉ MANUEL positive, with low but not absent c-peptide. Responding to glimepiride. Started on tresiba 2018.\par \par   PMH: psoriasis, on humira\par \par s/p COVID

## 2021-04-12 ENCOUNTER — RX RENEWAL (OUTPATIENT)
Age: 46
End: 2021-04-12

## 2021-07-14 LAB
HBA1C MFR BLD HPLC: 6.7
LDLC SERPL DIRECT ASSAY-MCNC: 77
MICROALBUMIN/CREAT 24H UR-RTO: NORMAL

## 2021-07-15 ENCOUNTER — APPOINTMENT (OUTPATIENT)
Dept: ENDOCRINOLOGY | Facility: CLINIC | Age: 46
End: 2021-07-15
Payer: COMMERCIAL

## 2021-07-15 VITALS
BODY MASS INDEX: 29.86 KG/M2 | WEIGHT: 197 LBS | DIASTOLIC BLOOD PRESSURE: 80 MMHG | SYSTOLIC BLOOD PRESSURE: 120 MMHG | HEART RATE: 103 BPM | HEIGHT: 68 IN

## 2021-07-15 LAB — GLUCOSE BLDC GLUCOMTR-MCNC: 146

## 2021-07-15 PROCEDURE — 99214 OFFICE O/P EST MOD 30 MIN: CPT | Mod: 25

## 2021-07-15 PROCEDURE — 82962 GLUCOSE BLOOD TEST: CPT

## 2021-07-15 NOTE — PHYSICAL EXAM
[Clear to Auscultation] : lungs were clear to auscultation bilaterally [Normal Rate] : heart rate was normal [Regular Rhythm] : with a regular rhythm

## 2021-07-15 NOTE — HISTORY OF PRESENT ILLNESS
[FreeTextEntry1] : DM type:TAMMY\par  Severity:moderate\par  Duration:12 years \par Onset:rapid weight loss and A1C of 13%  \par Associated symptoms or complications:none\par  \par  Current regimen: metformin 1000 bid\par  januvia 100\par  glimepiride 2 mg\par   tresiba 10 units at [facility]\par  \par \par Blood sugar monitorin-5 times a day,\par  fasting \par 2 hours after meals: 170-180\par \par exercise: elliptical at house, use to be a runner, weight training 3 days \par \par diet:  Pt. reports he has not been following a low carb diet causing A1C to rise\par morning in protein, fish\par afternoon varied \par dinner meat and vegetables\par deserts at times  \par \par Pt. presented 10 years ago with rapid weight loss and an A1C level of 13%. Prior to this was told that his blood sugar was a little high, but no action was taken. he was started on kombiglyze with rapid glucose lowering and hypoglycemia; then placed on metformin. Control was good for years, but eventually he required the addition of januvia. During the past year diabetic control has declined, with A1C measured to a level of 10%; there has been coincident stress, and pt. has been less adherent with diet.  A1C improved to 8.4% and now 7.8%. Strongly JOSÉ MANUEL positive, with low but not absent c-peptide. Responding to glimepiride. Started on tresiba 2018.\par \par   PMH: psoriasis, on humira\par \par s/p COVID

## 2021-09-03 ENCOUNTER — RX RENEWAL (OUTPATIENT)
Age: 46
End: 2021-09-03

## 2021-11-05 LAB
HBA1C MFR BLD HPLC: 7.9
LDLC SERPL DIRECT ASSAY-MCNC: 102
MICROALBUMIN/CREAT 24H UR-RTO: 3

## 2021-11-08 ENCOUNTER — TRANSCRIPTION ENCOUNTER (OUTPATIENT)
Age: 46
End: 2021-11-08

## 2021-11-08 ENCOUNTER — APPOINTMENT (OUTPATIENT)
Dept: ENDOCRINOLOGY | Facility: CLINIC | Age: 46
End: 2021-11-08
Payer: COMMERCIAL

## 2021-11-08 VITALS
OXYGEN SATURATION: 98 % | SYSTOLIC BLOOD PRESSURE: 125 MMHG | BODY MASS INDEX: 30.92 KG/M2 | HEIGHT: 68 IN | HEART RATE: 111 BPM | DIASTOLIC BLOOD PRESSURE: 80 MMHG | WEIGHT: 204 LBS

## 2021-11-08 LAB — GLUCOSE BLDC GLUCOMTR-MCNC: 145

## 2021-11-08 PROCEDURE — 82962 GLUCOSE BLOOD TEST: CPT

## 2021-11-08 PROCEDURE — 99214 OFFICE O/P EST MOD 30 MIN: CPT | Mod: 25

## 2021-11-08 NOTE — ASSESSMENT
[FreeTextEntry1] : TAMMY with waning insulin secretion. Advised prandial insulin; pt. reluctant to initiate at this time. Given sample of lyumjev to correct for hyperglycemia PRN glucose over 300 (5 units), and insturcted to call if diet plan does not work.

## 2021-11-10 ENCOUNTER — APPOINTMENT (OUTPATIENT)
Dept: FAMILY MEDICINE | Facility: CLINIC | Age: 46
End: 2021-11-10
Payer: COMMERCIAL

## 2021-11-10 DIAGNOSIS — Z91.018 ALLERGY TO OTHER FOODS: ICD-10-CM

## 2021-11-10 PROCEDURE — 99214 OFFICE O/P EST MOD 30 MIN: CPT | Mod: 95

## 2021-11-21 PROBLEM — Z91.018 MULTIPLE FOOD ALLERGIES: Status: ACTIVE | Noted: 2018-03-20

## 2021-12-23 ENCOUNTER — RX RENEWAL (OUTPATIENT)
Age: 46
End: 2021-12-23

## 2021-12-28 ENCOUNTER — RX RENEWAL (OUTPATIENT)
Age: 46
End: 2021-12-28

## 2022-02-01 ENCOUNTER — TRANSCRIPTION ENCOUNTER (OUTPATIENT)
Age: 47
End: 2022-02-01

## 2022-03-18 LAB
HBA1C MFR BLD HPLC: 7.9
LDLC SERPL CALC-MCNC: 104
MICROALBUMIN/CREAT 24H UR-RTO: 2

## 2022-03-21 ENCOUNTER — APPOINTMENT (OUTPATIENT)
Dept: ENDOCRINOLOGY | Facility: CLINIC | Age: 47
End: 2022-03-21
Payer: COMMERCIAL

## 2022-03-21 VITALS
WEIGHT: 205 LBS | DIASTOLIC BLOOD PRESSURE: 84 MMHG | SYSTOLIC BLOOD PRESSURE: 128 MMHG | OXYGEN SATURATION: 98 % | HEIGHT: 68 IN | HEART RATE: 90 BPM | BODY MASS INDEX: 31.07 KG/M2

## 2022-03-21 LAB — GLUCOSE BLDC GLUCOMTR-MCNC: 260

## 2022-03-21 PROCEDURE — 99214 OFFICE O/P EST MOD 30 MIN: CPT | Mod: 25

## 2022-03-21 PROCEDURE — 82962 GLUCOSE BLOOD TEST: CPT

## 2022-03-21 RX ORDER — GLIMEPIRIDE 2 MG/1
2 TABLET ORAL
Qty: 90 | Refills: 3 | Status: DISCONTINUED | COMMUNITY
Start: 2020-10-19 | End: 2022-03-21

## 2022-03-21 RX ORDER — METFORMIN ER 500 MG 500 MG/1
500 TABLET ORAL
Qty: 360 | Refills: 3 | Status: DISCONTINUED | COMMUNITY
Start: 2020-09-25 | End: 2022-03-21

## 2022-03-21 NOTE — ASSESSMENT
[FreeTextEntry1] : DM, Misti, with waning insulin secretion. Pt. now agreeable to start a basal/bolus regimen; to follow up with CDE. ALso a candidate for a CGM.rx fiasp, as pt. prefers that type of pen. Stop all orders.\par \par Glucose Sensor Necessity:  This patient with diabetes (dx: E10.65 ) has been performing 4 glucose checks per day for the last 60 days utilizing a home blood glucose monitor. The patient is treated with insulin via four injections dialy.  This patient requires frequent adjustments to his insulin treatment on the basis of therapeutic continuous glucose monitoring results by either adjusting fixed doses or sliding scale.  In addition, the patient has been to our office for an evaluation of his diabetes control within the past 6 months.  \par \par \par

## 2022-03-21 NOTE — HISTORY OF PRESENT ILLNESS
[FreeTextEntry1] : DM type:TAMMY\par  Severity:moderate\par  Duration:13 years \par Onset:rapid weight loss and A1C of 13%  \par Associated symptoms or complications:none\par  \par  Current regimen: metformin 1000 bid\par  januvia 100\par  glimepiride 2 mg\par   tresiba 10 units at [facility]\par  lyumjev PRN\par \par Blood sugar monitorin-5 times a day,\par  fasting \par 2 hours after meals: 170-180\par \par exercise: elliptical at house, use to be a runner, weight training 3 days \par \par diet:  Pt. reports he has not been following a low carb diet causing A1C to rise\par morning in protein, fish\par afternoon varied \par dinner meat and vegetables\par deserts at times  \par \par Pt. presented 13 years ago with rapid weight loss and an A1C level of 13%. Prior to this was told that his blood sugar was a little high, but no action was taken. he was started on kombiglyze with rapid glucose lowering and hypoglycemia; then placed on metformin. Control was good for years, but eventually he required the addition of januvia. Diabetic control has declined, with A1C measured to a level of 10%; there has been coincident stress, and pt. has been less adherent with diet.  A1C improved to 8.4% and then 7.8%. Strongly JOSÉ MANUEL positive, with low but not absent c-peptide. Responding to glimepiride. Started on tresiba 2018.\par \par   PMH: psoriasis, on humira\par \par s/p COVID

## 2022-03-22 ENCOUNTER — APPOINTMENT (OUTPATIENT)
Dept: ENDOCRINOLOGY | Facility: CLINIC | Age: 47
End: 2022-03-22
Payer: COMMERCIAL

## 2022-03-22 PROCEDURE — ZZZZZ: CPT

## 2022-03-22 PROCEDURE — 95249 CONT GLUC MNTR PT PROV EQP: CPT

## 2022-03-23 RX ORDER — INSULIN ASPART INJECTION 100 [IU]/ML
100 INJECTION, SOLUTION SUBCUTANEOUS
Qty: 1 | Refills: 2 | Status: DISCONTINUED | COMMUNITY
Start: 2022-03-21 | End: 2022-03-23

## 2022-05-16 ENCOUNTER — NON-APPOINTMENT (OUTPATIENT)
Age: 47
End: 2022-05-16

## 2022-05-20 ENCOUNTER — TRANSCRIPTION ENCOUNTER (OUTPATIENT)
Age: 47
End: 2022-05-20

## 2022-05-20 RX ORDER — PANTOPRAZOLE 40 MG/1
40 TABLET, DELAYED RELEASE ORAL
Qty: 90 | Refills: 3 | Status: ACTIVE | COMMUNITY
Start: 2019-02-22 | End: 1900-01-01

## 2022-06-02 ENCOUNTER — TRANSCRIPTION ENCOUNTER (OUTPATIENT)
Age: 47
End: 2022-06-02

## 2022-06-08 ENCOUNTER — TRANSCRIPTION ENCOUNTER (OUTPATIENT)
Age: 47
End: 2022-06-08

## 2022-06-10 ENCOUNTER — TRANSCRIPTION ENCOUNTER (OUTPATIENT)
Age: 47
End: 2022-06-10

## 2022-07-27 ENCOUNTER — APPOINTMENT (OUTPATIENT)
Dept: ENDOCRINOLOGY | Facility: CLINIC | Age: 47
End: 2022-07-27

## 2022-08-01 ENCOUNTER — TRANSCRIPTION ENCOUNTER (OUTPATIENT)
Age: 47
End: 2022-08-01

## 2022-08-04 LAB
HBA1C MFR BLD HPLC: 6.6
LDLC SERPL DIRECT ASSAY-MCNC: 88
MICROALBUMIN/CREAT 24H UR-RTO: 3

## 2022-08-05 ENCOUNTER — APPOINTMENT (OUTPATIENT)
Dept: ENDOCRINOLOGY | Facility: CLINIC | Age: 47
End: 2022-08-05

## 2022-08-05 VITALS
BODY MASS INDEX: 31.52 KG/M2 | HEIGHT: 68 IN | SYSTOLIC BLOOD PRESSURE: 120 MMHG | WEIGHT: 208 LBS | OXYGEN SATURATION: 97 % | DIASTOLIC BLOOD PRESSURE: 78 MMHG | HEART RATE: 82 BPM

## 2022-08-05 LAB — GLUCOSE BLDC GLUCOMTR-MCNC: 185

## 2022-08-05 PROCEDURE — 82962 GLUCOSE BLOOD TEST: CPT

## 2022-08-05 PROCEDURE — 99214 OFFICE O/P EST MOD 30 MIN: CPT | Mod: 25

## 2022-08-05 NOTE — ASSESSMENT
[FreeTextEntry1] : DM, TAMMY, now on basal/bolus with good success, but needs more advanced carb counting, and keri likely want to transition to pump therapy. WIll refer to CDE\par \par Glucose Sensor Necessity:  This patient with diabetes (dx: E10.65 ) has been performing 4 glucose checks per day for the last 60 days utilizing a home blood glucose monitor. The patient is treated with insulin via four injections dialy.  This patient requires frequent adjustments to his insulin treatment on the basis of therapeutic continuous glucose monitoring results by either adjusting fixed doses or sliding scale.  In addition, the patient has been to our office for an evaluation of his diabetes control within the past 6 months.  \par \par \par

## 2022-08-24 ENCOUNTER — APPOINTMENT (OUTPATIENT)
Dept: ENDOCRINOLOGY | Facility: CLINIC | Age: 47
End: 2022-08-24

## 2022-08-24 PROCEDURE — G0108 DIAB MANAGE TRN  PER INDIV: CPT

## 2022-09-05 ENCOUNTER — TRANSCRIPTION ENCOUNTER (OUTPATIENT)
Age: 47
End: 2022-09-05

## 2022-09-07 ENCOUNTER — TRANSCRIPTION ENCOUNTER (OUTPATIENT)
Age: 47
End: 2022-09-07

## 2022-09-08 ENCOUNTER — TRANSCRIPTION ENCOUNTER (OUTPATIENT)
Age: 47
End: 2022-09-08

## 2022-09-13 ENCOUNTER — RX RENEWAL (OUTPATIENT)
Age: 47
End: 2022-09-13

## 2022-11-03 LAB
HBA1C MFR BLD HPLC: 6.7
LDLC SERPL DIRECT ASSAY-MCNC: 101
MICROALBUMIN/CREAT UR-RTO: 2

## 2022-11-04 ENCOUNTER — APPOINTMENT (OUTPATIENT)
Dept: ENDOCRINOLOGY | Facility: CLINIC | Age: 47
End: 2022-11-04

## 2022-11-04 VITALS
HEIGHT: 68 IN | HEART RATE: 99 BPM | SYSTOLIC BLOOD PRESSURE: 132 MMHG | BODY MASS INDEX: 32.58 KG/M2 | WEIGHT: 215 LBS | OXYGEN SATURATION: 98 % | DIASTOLIC BLOOD PRESSURE: 88 MMHG

## 2022-11-04 DIAGNOSIS — E11.65 TYPE 2 DIABETES MELLITUS WITH HYPERGLYCEMIA: ICD-10-CM

## 2022-11-04 DIAGNOSIS — Z86.39 PERSONAL HISTORY OF OTHER ENDOCRINE, NUTRITIONAL AND METABOLIC DISEASE: ICD-10-CM

## 2022-11-04 DIAGNOSIS — E10.65 TYPE 1 DIABETES MELLITUS WITH HYPERGLYCEMIA: ICD-10-CM

## 2022-11-04 PROCEDURE — 95251 CONT GLUC MNTR ANALYSIS I&R: CPT

## 2022-11-04 PROCEDURE — 99214 OFFICE O/P EST MOD 30 MIN: CPT | Mod: 25

## 2022-11-04 RX ORDER — ADALIMUMAB 40MG/0.4ML
40 KIT SUBCUTANEOUS
Qty: 2 | Refills: 0 | Status: DISCONTINUED | COMMUNITY
Start: 2022-02-07

## 2022-11-04 RX ORDER — INSULIN LISPRO-AABC 100 [IU]/ML
100 INJECTION, SOLUTION SUBCUTANEOUS
Qty: 1 | Refills: 1 | Status: DISCONTINUED | COMMUNITY
Start: 2022-03-23 | End: 2022-11-04

## 2022-11-04 RX ORDER — AMOXICILLIN 500 MG/1
500 CAPSULE ORAL
Qty: 21 | Refills: 0 | Status: DISCONTINUED | COMMUNITY
Start: 2022-05-10

## 2022-11-04 RX ORDER — IBUPROFEN 800 MG/1
800 TABLET, FILM COATED ORAL
Qty: 30 | Refills: 0 | Status: DISCONTINUED | COMMUNITY
Start: 2022-08-05

## 2022-11-04 NOTE — PHYSICAL EXAM
[Alert] : alert [Well Nourished] : well nourished [No Acute Distress] : no acute distress [Well Developed] : well developed [Normal Sclera/Conjunctiva] : normal sclera/conjunctiva [No Proptosis] : no proptosis [Normal Hearing] : hearing was normal [No LAD] : no lymphadenopathy [Thyroid Not Enlarged] : the thyroid was not enlarged [No Thyroid Nodules] : no palpable thyroid nodules [No Respiratory Distress] : no respiratory distress [No Accessory Muscle Use] : no accessory muscle use [Normal Rate and Effort] : normal respiratory rate and effort [Clear to Auscultation] : lungs were clear to auscultation bilaterally [Normal S1, S2] : normal S1 and S2 [Normal Rate] : heart rate was normal [Regular Rhythm] : with a regular rhythm [No Edema] : no peripheral edema [Normal Bowel Sounds] : normal bowel sounds [Not Tender] : non-tender [Soft] : abdomen soft [No Stigmata of Cushings Syndrome] : no stigmata of Cushings Syndrome [Normal Gait] : normal gait [No Rash] : no rash [Acanthosis Nigricans] : no acanthosis nigricans [No Tremors] : no tremors [Oriented x3] : oriented to person, place, and time [Normal Affect] : the affect was normal [Normal Insight/Judgement] : insight and judgment were intact [Normal Mood] : the mood was normal

## 2022-11-04 NOTE — ASSESSMENT
[FreeTextEntry1] : DM, TAMMY, now on basal/bolus with good success, continues to need more advanced carb counting, and will likely want to transition to pump therapy. Patient received the Dexcom, he will try the sensor if he does not like CGM will switch to Freestyle Deven 3 sensor. Advised to have carb before working out to avoid post workout lows and do not over correct high blood sugars to avoid post prandial lows. Continue current insulin regimen for now. Stressed the importance of annual retinopathy screening exam, needs eye appointment. Repeat labs before next visit. \par \par Elevated LDL: continue to follow a low fat diet and continue workout regimen, repeat lipids before next visit, may benefit from starting statin in the future \par \par Glucose Sensor Necessity:  This patient with diabetes (dx: E13.9 ) has been performing 4 glucose checks per day for the last 60 days utilizing a home blood glucose monitor. The patient is treated with insulin via four injections dialy.  This patient requires frequent adjustments to his insulin treatment on the basis of therapeutic continuous glucose monitoring results by either adjusting fixed doses or sliding scale.  In addition, the patient has been to our office for an evaluation of his diabetes control within the past 6 months.  \par \par RTO in 3 months with Dr. Sim  [Importance of Diet and Exercise] : importance of diet and exercise to improve glycemic control, achieve weight loss and improve cardiovascular health

## 2022-11-04 NOTE — HISTORY OF PRESENT ILLNESS
[Continuous Glucose Monitoring] : Continuous Glucose Monitoring: Yes [Deven] : Deven [FreeTextEntry1] : DM type:TAMMY\par  Severity:moderate\par  Duration:13 years \par Onset:rapid weight loss and A1C of 13%  \par Associated symptoms or complications:none\par  \par  Current regimen: \par  tresiba 12 units at [facility]\par  lyumjev 3-4 units in morning, 5-6 units before meals PRN\par \par Blood sugar monitorin-5 times a day,\par   Freestyle Deven results reviewed: a couple lows after lunch and dinner \par Patient reports working out during the day causing lows after work outs, late night lows due to over correcting \par Deven scanned now 153 \par \par exercise: elliptical at house, use to be a runner, weight training 3 days \par \par No recent eye exam\par  \par diet:  Pt. reports he has not been following a low carb diet causing A1C to rise\par morning in protein, fish\par afternoon varied \par dinner meat and vegetables\par deserts at times  \par \par Pt. presented 13 years ago with rapid weight loss and an A1C level of 13%. Prior to this was told that his blood sugar was a little high, but no action was taken. he was started on kombiglyze with rapid glucose lowering and hypoglycemia; then placed on metformin. Control was good for years, but eventually he required the addition of januvia. Diabetic control has declined, with A1C measured to a level of 10%; there has been coincident stress, and pt. has been less adherent with diet.  A1C improved to 8.4% and then 7.8%. Strongly JOSÉ MANUEL positive, with low but not absent c-peptide. Responding to glimepiride. Started on tresiba 2018.\par \par   PMH: psoriasis, on humira [FreeTextEntry2] : 70 [FreeTextEntry3] : 28 [FreeTextEntry4] : 2 [de-identified] : 7.1 [FreeTextEntry5] : 158 [FreeTextEntry6] : 29.7

## 2022-11-04 NOTE — REVIEW OF SYSTEMS
[Recent Weight Gain (___ Lbs)] : recent weight gain: [unfilled] lbs [Fatigue] : no fatigue [Decreased Appetite] : appetite not decreased [Recent Weight Loss (___ Lbs)] : no recent weight loss [Visual Field Defect] : no visual field defect [Blurred Vision] : no blurred vision [Dysphagia] : no dysphagia [Neck Pain] : no neck pain [Dysphonia] : no dysphonia [Chest Pain] : no chest pain [Palpitations] : no palpitations [Constipation] : no constipation [Diarrhea] : no diarrhea [Polyuria] : no polyuria [Dysuria] : no dysuria [Headaches] : no headaches [Tremors] : no tremors [Depression] : no depression [Anxiety] : no anxiety [Polydipsia] : no polydipsia [Swelling] : no swelling

## 2022-11-23 ENCOUNTER — TRANSCRIPTION ENCOUNTER (OUTPATIENT)
Age: 47
End: 2022-11-23

## 2022-12-29 ENCOUNTER — TRANSCRIPTION ENCOUNTER (OUTPATIENT)
Age: 47
End: 2022-12-29

## 2023-01-10 ENCOUNTER — TRANSCRIPTION ENCOUNTER (OUTPATIENT)
Age: 48
End: 2023-01-10

## 2023-02-01 ENCOUNTER — TRANSCRIPTION ENCOUNTER (OUTPATIENT)
Age: 48
End: 2023-02-01

## 2023-02-02 LAB
HBA1C MFR BLD HPLC: 6.6
LDLC SERPL CALC-MCNC: 91
MICROALBUMIN/CREAT 24H UR-RTO: 5

## 2023-02-03 ENCOUNTER — APPOINTMENT (OUTPATIENT)
Dept: ENDOCRINOLOGY | Facility: CLINIC | Age: 48
End: 2023-02-03
Payer: COMMERCIAL

## 2023-02-03 ENCOUNTER — NON-APPOINTMENT (OUTPATIENT)
Age: 48
End: 2023-02-03

## 2023-02-03 PROCEDURE — 99214 OFFICE O/P EST MOD 30 MIN: CPT | Mod: 95

## 2023-02-03 NOTE — ASSESSMENT
[Importance of Diet and Exercise] : importance of diet and exercise to improve glycemic control, achieve weight loss and improve cardiovascular health [FreeTextEntry1] : DM, TAMMY, now on basal/bolus with good success, \par discussed pump therapy options, pt. uncertain\par some glycemic variability overnight\par will review froilan report\par \par \par Glucose Sensor Necessity:  This patient with diabetes (dx: E13.9 ) has been performing 4 glucose checks per day for the last 60 days utilizing a home blood glucose monitor. The patient is treated with insulin via four injections dialy.  This patient requires frequent adjustments to his insulin treatment on the basis of therapeutic continuous glucose monitoring results by either adjusting fixed doses or sliding scale.  In addition, the patient has been to our office for an evaluation of his diabetes control within the past 6 months.  \par \par RTO in 3 months with Dr. Sim

## 2023-02-03 NOTE — HISTORY OF PRESENT ILLNESS
[Home] : at home, [unfilled] , at the time of the visit. [Other Location: e.g. Home (Enter Location, City,State)___] : at [unfilled] [Verbal consent obtained from patient] : the patient, [unfilled] [FreeTextEntry1] : DM type:TAMMY\par  Severity:moderate\par  Duration:14 years \par Onset:rapid weight loss and A1C of 13%  \par Associated symptoms or complications:none\par  \par  Current regimen: \par  tresiba 12 units at Hs\par  lyumjev 3-4 units in morning, 5-6 units before meals PRN\par \par Blood sugar monitorin-5 times a day,\par   Freestyle Deven results reviewed: a couple lows after lunch and dinner \par Patient reports working out during the day causing lows after work outs, late night lows due to over correcting \par Deven scanned now 153 \par \par exercise: elliptical at house, use to be a runner, weight training 3 days \par \par No recent eye exam\par  \par diet:  Pt. reports he has not been following a low carb diet causing A1C to rise\par morning in protein, fish\par afternoon varied \par dinner meat and vegetables\par deserts at times  \par \par Pt. presented 13 years ago with rapid weight loss and an A1C level of 13%. Prior to this was told that his blood sugar was a little high, but no action was taken. he was started on kombiglyze with rapid glucose lowering and hypoglycemia; then placed on metformin. Control was good for years, but eventually he required the addition of januvia. Diabetic control has declined, with A1C measured to a level of 10%; there has been coincident stress, and pt. has been less adherent with diet.  A1C improved to 8.4% and then 7.8%. Strongly JOSÉ MANUEL positive, with low but not absent c-peptide. Responding to glimepiride. Started on tresiba 2018.\par \par   PMH: psoriasis, on humira [Continuous Glucose Monitoring] : Continuous Glucose Monitoring: Yes [Deven] : Deven [FreeTextEntry2] : 70 [FreeTextEntry3] : 28 [FreeTextEntry4] : 2 [de-identified] : 7.1 [FreeTextEntry5] : 158 [FreeTextEntry6] : 29.7

## 2023-02-09 ENCOUNTER — TRANSCRIPTION ENCOUNTER (OUTPATIENT)
Age: 48
End: 2023-02-09

## 2023-02-23 ENCOUNTER — TRANSCRIPTION ENCOUNTER (OUTPATIENT)
Age: 48
End: 2023-02-23

## 2023-02-27 ENCOUNTER — TRANSCRIPTION ENCOUNTER (OUTPATIENT)
Age: 48
End: 2023-02-27

## 2023-03-03 ENCOUNTER — TRANSCRIPTION ENCOUNTER (OUTPATIENT)
Age: 48
End: 2023-03-03

## 2023-03-24 ENCOUNTER — TRANSCRIPTION ENCOUNTER (OUTPATIENT)
Age: 48
End: 2023-03-24

## 2023-04-17 ENCOUNTER — TRANSCRIPTION ENCOUNTER (OUTPATIENT)
Age: 48
End: 2023-04-17

## 2023-04-19 ENCOUNTER — APPOINTMENT (OUTPATIENT)
Dept: FAMILY MEDICINE | Facility: CLINIC | Age: 48
End: 2023-04-19

## 2023-05-01 ENCOUNTER — APPOINTMENT (OUTPATIENT)
Dept: ENDOCRINOLOGY | Facility: CLINIC | Age: 48
End: 2023-05-01
Payer: COMMERCIAL

## 2023-05-01 VITALS
DIASTOLIC BLOOD PRESSURE: 84 MMHG | BODY MASS INDEX: 33.19 KG/M2 | HEIGHT: 68 IN | WEIGHT: 219 LBS | SYSTOLIC BLOOD PRESSURE: 120 MMHG | HEART RATE: 95 BPM | OXYGEN SATURATION: 97 %

## 2023-05-01 DIAGNOSIS — E78.00 PURE HYPERCHOLESTEROLEMIA, UNSPECIFIED: ICD-10-CM

## 2023-05-01 LAB
GLUCOSE BLDC GLUCOMTR-MCNC: 110
HBA1C MFR BLD HPLC: 6.7
LDLC SERPL DIRECT ASSAY-MCNC: 96
MICROALBUMIN/CREAT 24H UR-RTO: 4

## 2023-05-01 PROCEDURE — 99214 OFFICE O/P EST MOD 30 MIN: CPT | Mod: 25

## 2023-05-01 PROCEDURE — 82962 GLUCOSE BLOOD TEST: CPT

## 2023-05-01 PROCEDURE — 95251 CONT GLUC MNTR ANALYSIS I&R: CPT

## 2023-05-01 RX ORDER — PEN NEEDLE, DIABETIC 29 G X1/2"
32G X 4 MM NEEDLE, DISPOSABLE MISCELLANEOUS
Qty: 2 | Refills: 3 | Status: ACTIVE | COMMUNITY
Start: 2018-09-11 | End: 1900-01-01

## 2023-05-01 NOTE — HISTORY OF PRESENT ILLNESS
[Continuous Glucose Monitoring] : Continuous Glucose Monitoring: Yes [Deven] : Deven [FreeTextEntry1] : Patient concern with weight gain and unable to lose weight \par \par DM type:TAMMY\par  Severity:moderate\par  Duration:14 years \par Onset:rapid weight loss and A1C of 13%  \par Associated symptoms or complications:none\par  \par  Current regimen: \par  tresiba 12-16 units at Hs\par  lyumjev 4-6 units in morning, 5-6 units before meals PRN\par \par Blood sugar monitorin-5 times a day,\par   Freestyle Deven results reviewed: higher blood sugars at dinner\par Current \par \par exercise: elliptical at house, use to be a runner, weight training 3 days \par \par No recent eye exam\par  \par diet:  Pt. reports he has not been following a low carb diet causing A1C to rise\par morning in protein, fish\par afternoon varied \par dinner meat and vegetables\par deserts at times  \par \par Pt. presented 13 years ago with rapid weight loss and an A1C level of 13%. Prior to this was told that his blood sugar was a little high, but no action was taken. he was started on kombiglyze with rapid glucose lowering and hypoglycemia; then placed on metformin. Control was good for years, but eventually he required the addition of januvia. Diabetic control has declined, with A1C measured to a level of 10%; there has been coincident stress, and pt. has been less adherent with diet.  A1C improved to 8.4% and then 7.8%. Strongly JOSÉ MANUEL positive, with low but not absent c-peptide. Responding to glimepiride. Started on tresiba 2018.\par \par   PMH: psoriasis, on humira [FreeTextEntry2] : 86 [FreeTextEntry3] : 13 [FreeTextEntry4] : 1 [de-identified] : 6.7 [FreeTextEntry5] : 143 [FreeTextEntry7] : 25.6

## 2023-05-01 NOTE — PHYSICAL EXAM
[Alert] : alert [Well Nourished] : well nourished [No Acute Distress] : no acute distress [Well Developed] : well developed [Normal Sclera/Conjunctiva] : normal sclera/conjunctiva [No Proptosis] : no proptosis [No LAD] : no lymphadenopathy [Thyroid Not Enlarged] : the thyroid was not enlarged [No Thyroid Nodules] : no palpable thyroid nodules [No Respiratory Distress] : no respiratory distress [No Accessory Muscle Use] : no accessory muscle use [Normal Rate and Effort] : normal respiratory rate and effort [Clear to Auscultation] : lungs were clear to auscultation bilaterally [Normal S1, S2] : normal S1 and S2 [Normal Rate] : heart rate was normal [Regular Rhythm] : with a regular rhythm [No Edema] : no peripheral edema [Normal Bowel Sounds] : normal bowel sounds [Not Tender] : non-tender [Soft] : abdomen soft [Normal Gait] : normal gait [No Rash] : no rash [No Tremors] : no tremors [Oriented x3] : oriented to person, place, and time [Normal Affect] : the affect was normal [Normal Insight/Judgement] : insight and judgment were intact [Normal Mood] : the mood was normal [Acanthosis Nigricans] : no acanthosis nigricans

## 2023-05-01 NOTE — ASSESSMENT
[FreeTextEntry1] : DM, TAMMY, now on basal/bolus with good success\par discussed pump therapy options, pt. uncertain\par start Ozempic 0.25 mg weekly then in 4 weeks teletraining well increase 0.5 mg weekly, reviewed s/e, denies pancreatitis and/or Thyroid Medullary Cancer, for weight loss, once starts Ozempic decrease insulin by 10% \par some glycemic variability at dinner\par repeat labs before next visit\par \par Elevated LDL: continue a low fat diet, repeat labs before next visit\par \par \par Glucose Sensor Necessity:  This patient with diabetes (dx: E13.9 ) has been performing 4 glucose checks per day for the last 60 days utilizing a home blood glucose monitor. The patient is treated with insulin via four injections dialy.  This patient requires frequent adjustments to his insulin treatment on the basis of therapeutic continuous glucose monitoring results by either adjusting fixed doses or sliding scale.  In addition, the patient has been to our office for an evaluation of his diabetes control within the past 6 months.  \par \par RTO in 3 months with Dr. Sim \par \par Plan reviewed with Dr. Sim

## 2023-05-01 NOTE — REVIEW OF SYSTEMS
[Fatigue] : no fatigue [Decreased Appetite] : appetite not decreased [Recent Weight Gain (___ Lbs)] : no recent weight gain [Recent Weight Loss (___ Lbs)] : no recent weight loss [Visual Field Defect] : no visual field defect [Blurred Vision] : no blurred vision [Dysphagia] : no dysphagia [Neck Pain] : no neck pain [Dysphonia] : no dysphonia [Chest Pain] : no chest pain [Palpitations] : no palpitations [Constipation] : no constipation [Diarrhea] : no diarrhea [Polyuria] : no polyuria [Dysuria] : no dysuria [Headaches] : no headaches [Tremors] : no tremors [Depression] : no depression [Anxiety] : no anxiety [Polydipsia] : no polydipsia [Swelling] : no swelling [FreeTextEntry2] : weight stable

## 2023-05-03 ENCOUNTER — APPOINTMENT (OUTPATIENT)
Dept: FAMILY MEDICINE | Facility: CLINIC | Age: 48
End: 2023-05-03

## 2023-05-10 ENCOUNTER — TRANSCRIPTION ENCOUNTER (OUTPATIENT)
Age: 48
End: 2023-05-10

## 2023-05-10 DIAGNOSIS — E66.9 OBESITY, UNSPECIFIED: ICD-10-CM

## 2023-05-10 RX ORDER — SEMAGLUTIDE 0.68 MG/ML
2 INJECTION, SOLUTION SUBCUTANEOUS
Qty: 4 | Refills: 3 | Status: DISCONTINUED | COMMUNITY
Start: 2023-05-01 | End: 2023-05-10

## 2023-05-11 ENCOUNTER — TRANSCRIPTION ENCOUNTER (OUTPATIENT)
Age: 48
End: 2023-05-11

## 2023-05-12 ENCOUNTER — NON-APPOINTMENT (OUTPATIENT)
Age: 48
End: 2023-05-12

## 2023-05-16 ENCOUNTER — TRANSCRIPTION ENCOUNTER (OUTPATIENT)
Age: 48
End: 2023-05-16

## 2023-05-23 ENCOUNTER — RX RENEWAL (OUTPATIENT)
Age: 48
End: 2023-05-23

## 2023-05-23 ENCOUNTER — TRANSCRIPTION ENCOUNTER (OUTPATIENT)
Age: 48
End: 2023-05-23

## 2023-05-23 RX ORDER — SEMAGLUTIDE 0.25 MG/.5ML
0.25 INJECTION, SOLUTION SUBCUTANEOUS
Qty: 3 | Refills: 1 | Status: DISCONTINUED | COMMUNITY
Start: 2023-05-10 | End: 2023-05-23

## 2023-07-17 ENCOUNTER — RX RENEWAL (OUTPATIENT)
Age: 48
End: 2023-07-17

## 2023-07-17 RX ORDER — PEN NEEDLE, DIABETIC 29 G X1/2"
32G X 4 MM NEEDLE, DISPOSABLE MISCELLANEOUS
Qty: 360 | Refills: 3 | Status: ACTIVE | COMMUNITY
Start: 2022-05-16 | End: 1900-01-01

## 2023-08-03 ENCOUNTER — APPOINTMENT (OUTPATIENT)
Dept: ENDOCRINOLOGY | Facility: CLINIC | Age: 48
End: 2023-08-03
Payer: COMMERCIAL

## 2023-08-03 VITALS
SYSTOLIC BLOOD PRESSURE: 120 MMHG | HEART RATE: 88 BPM | OXYGEN SATURATION: 99 % | BODY MASS INDEX: 31.83 KG/M2 | WEIGHT: 210 LBS | HEIGHT: 68 IN | DIASTOLIC BLOOD PRESSURE: 60 MMHG

## 2023-08-03 LAB — GLUCOSE BLDC GLUCOMTR-MCNC: 123

## 2023-08-03 PROCEDURE — 99214 OFFICE O/P EST MOD 30 MIN: CPT | Mod: 25

## 2023-08-03 PROCEDURE — 82962 GLUCOSE BLOOD TEST: CPT

## 2023-08-03 PROCEDURE — 95251 CONT GLUC MNTR ANALYSIS I&R: CPT

## 2023-08-03 RX ORDER — BLOOD-GLUCOSE SENSOR
EACH MISCELLANEOUS
Qty: 6 | Refills: 3 | Status: ACTIVE | COMMUNITY
Start: 2022-03-31 | End: 1900-01-01

## 2023-08-03 RX ORDER — INSULIN DEGLUDEC INJECTION 100 U/ML
100 INJECTION, SOLUTION SUBCUTANEOUS DAILY
Qty: 1 | Refills: 3 | Status: ACTIVE | COMMUNITY
Start: 2018-09-11 | End: 1900-01-01

## 2023-08-03 NOTE — HISTORY OF PRESENT ILLNESS
[FreeTextEntry1] : DM type:TAMMY  Severity:moderate  Duration:14 years  Onset:rapid weight loss and A1C of 13%   Associated symptoms or complications:none    Current regimen:   tresiba 10 units at Hs  lyumjev 3-4 units in morning, 5-6 units before meals PRN  ozempic .5  Blood sugar monitorin-5 times a day,   Freestyle Deven results reviewed: a couple lows after lunch and dinner  Patient reports working out during the day causing lows after work outs, late night lows due to over correcting  Deven scanned now 153 . Usng deven 3  exercise: elliptical at house, use to be a runner, weight training 3 days   No recent eye exam   diet:  Pt. reports he has not been following a low carb diet causing A1C to rise morning in protein, fish afternoon varied  dinner meat and vegetables deserts at times    Pt. presented 13 years ago with rapid weight loss and an A1C level of 13%. Prior to this was told that his blood sugar was a little high, but no action was taken. he was started on kombiglyze with rapid glucose lowering and hypoglycemia; then placed on metformin. Control was good for years, but eventually he required the addition of januvia. Diabetic control has declined, with A1C measured to a level of 10%; there has been coincident stress, and pt. has been less adherent with diet.  A1C improved to 8.4% and then 7.8%. Strongly JOSÉ MANUEL positive, with low but not absent c-peptide. Responding to glimepiride. Started on tresiba 2018.    PMH: psoriasis, on humira [Continuous Glucose Monitoring] : Continuous Glucose Monitoring: Yes [Deven] : Deven [FreeTextEntry2] : 89 [FreeTextEntry3] : 11 [FreeTextEntry4] : 0 [de-identified] : 6.6 [FreeTextEntry5] : 138 [FreeTextEntry6] : 26.3

## 2023-08-03 NOTE — ASSESSMENT
[Importance of Diet and Exercise] : importance of diet and exercise to improve glycemic control, achieve weight loss and improve cardiovascular health [FreeTextEntry1] : DM, TAMMY, now on basal/bolus with good success,  ozempic and froilan 3 have been very helpful   Glucose Sensor Necessity:  This patient with diabetes (dx: E13.9 ) has been performing 4 glucose checks per day for the last 60 days utilizing a home blood glucose monitor. The patient is treated with insulin via four injections dialy.  This patient requires frequent adjustments to his insulin treatment on the basis of therapeutic continuous glucose monitoring results by either adjusting fixed doses or sliding scale.  In addition, the patient has been to our office for an evaluation of his diabetes control within the past 6 months.

## 2023-08-07 ENCOUNTER — TRANSCRIPTION ENCOUNTER (OUTPATIENT)
Age: 48
End: 2023-08-07

## 2023-08-17 ENCOUNTER — TRANSCRIPTION ENCOUNTER (OUTPATIENT)
Age: 48
End: 2023-08-17

## 2023-09-07 ENCOUNTER — APPOINTMENT (OUTPATIENT)
Dept: OPHTHALMOLOGY | Facility: CLINIC | Age: 48
End: 2023-09-07
Payer: COMMERCIAL

## 2023-09-07 ENCOUNTER — NON-APPOINTMENT (OUTPATIENT)
Age: 48
End: 2023-09-07

## 2023-09-07 PROCEDURE — 92133 CPTRZD OPH DX IMG PST SGM ON: CPT

## 2023-09-07 PROCEDURE — 92004 COMPRE OPH EXAM NEW PT 1/>: CPT

## 2023-09-25 ENCOUNTER — APPOINTMENT (OUTPATIENT)
Dept: FAMILY MEDICINE | Facility: CLINIC | Age: 48
End: 2023-09-25
Payer: COMMERCIAL

## 2023-09-25 VITALS
HEART RATE: 88 BPM | TEMPERATURE: 98.2 F | OXYGEN SATURATION: 96 % | HEIGHT: 68 IN | WEIGHT: 210 LBS | SYSTOLIC BLOOD PRESSURE: 125 MMHG | BODY MASS INDEX: 31.83 KG/M2 | DIASTOLIC BLOOD PRESSURE: 85 MMHG

## 2023-09-25 DIAGNOSIS — R94.6 ABNORMAL RESULTS OF THYROID FUNCTION STUDIES: ICD-10-CM

## 2023-09-25 PROCEDURE — 99214 OFFICE O/P EST MOD 30 MIN: CPT

## 2023-10-05 ENCOUNTER — TRANSCRIPTION ENCOUNTER (OUTPATIENT)
Age: 48
End: 2023-10-05

## 2023-10-11 ENCOUNTER — TRANSCRIPTION ENCOUNTER (OUTPATIENT)
Age: 48
End: 2023-10-11

## 2023-11-06 ENCOUNTER — TRANSCRIPTION ENCOUNTER (OUTPATIENT)
Age: 48
End: 2023-11-06

## 2023-11-06 RX ORDER — BLOOD-GLUCOSE METER
KIT MISCELLANEOUS
Qty: 1 | Refills: 0 | Status: ACTIVE | COMMUNITY
Start: 2019-01-16 | End: 1900-01-01

## 2023-11-08 ENCOUNTER — TRANSCRIPTION ENCOUNTER (OUTPATIENT)
Age: 48
End: 2023-11-08

## 2023-11-10 ENCOUNTER — TRANSCRIPTION ENCOUNTER (OUTPATIENT)
Age: 48
End: 2023-11-10

## 2023-11-20 ENCOUNTER — APPOINTMENT (OUTPATIENT)
Dept: FAMILY MEDICINE | Facility: CLINIC | Age: 48
End: 2023-11-20
Payer: COMMERCIAL

## 2023-11-20 VITALS
BODY MASS INDEX: 30.92 KG/M2 | TEMPERATURE: 97 F | SYSTOLIC BLOOD PRESSURE: 131 MMHG | OXYGEN SATURATION: 98 % | HEIGHT: 68 IN | WEIGHT: 204 LBS | HEART RATE: 89 BPM | DIASTOLIC BLOOD PRESSURE: 81 MMHG | RESPIRATION RATE: 18 BRPM

## 2023-11-20 DIAGNOSIS — L40.9 PSORIASIS, UNSPECIFIED: ICD-10-CM

## 2023-11-20 DIAGNOSIS — Z00.00 ENCOUNTER FOR GENERAL ADULT MEDICAL EXAMINATION W/OUT ABNORMAL FINDINGS: ICD-10-CM

## 2023-11-20 DIAGNOSIS — E13.9 OTHER SPECIFIED DIABETES MELLITUS W/OUT COMPLICATIONS: ICD-10-CM

## 2023-11-20 DIAGNOSIS — M62.838 OTHER MUSCLE SPASM: ICD-10-CM

## 2023-11-20 PROCEDURE — G0444 DEPRESSION SCREEN ANNUAL: CPT | Mod: 59

## 2023-11-20 PROCEDURE — 99396 PREV VISIT EST AGE 40-64: CPT | Mod: 25

## 2023-11-20 PROCEDURE — 96127 BRIEF EMOTIONAL/BEHAV ASSMT: CPT | Mod: 59

## 2023-11-20 PROCEDURE — 96160 PT-FOCUSED HLTH RISK ASSMT: CPT | Mod: 59

## 2023-11-22 ENCOUNTER — TRANSCRIPTION ENCOUNTER (OUTPATIENT)
Age: 48
End: 2023-11-22

## 2023-11-22 RX ORDER — CYCLOBENZAPRINE HYDROCHLORIDE 10 MG/1
10 TABLET, FILM COATED ORAL
Qty: 30 | Refills: 0 | Status: ACTIVE | COMMUNITY
Start: 2021-09-17 | End: 1900-01-01

## 2023-11-29 ENCOUNTER — APPOINTMENT (OUTPATIENT)
Dept: UROLOGY | Facility: CLINIC | Age: 48
End: 2023-11-29
Payer: COMMERCIAL

## 2023-11-29 VITALS
OXYGEN SATURATION: 97 % | DIASTOLIC BLOOD PRESSURE: 84 MMHG | SYSTOLIC BLOOD PRESSURE: 132 MMHG | BODY MASS INDEX: 30.62 KG/M2 | HEIGHT: 68 IN | WEIGHT: 202 LBS | HEART RATE: 96 BPM

## 2023-11-29 PROCEDURE — 99204 OFFICE O/P NEW MOD 45 MIN: CPT

## 2023-12-04 ENCOUNTER — APPOINTMENT (OUTPATIENT)
Dept: UROLOGY | Facility: CLINIC | Age: 48
End: 2023-12-04
Payer: COMMERCIAL

## 2023-12-04 ENCOUNTER — TRANSCRIPTION ENCOUNTER (OUTPATIENT)
Age: 48
End: 2023-12-04

## 2023-12-04 VITALS
SYSTOLIC BLOOD PRESSURE: 128 MMHG | OXYGEN SATURATION: 98 % | DIASTOLIC BLOOD PRESSURE: 86 MMHG | HEIGHT: 68 IN | RESPIRATION RATE: 16 BRPM | WEIGHT: 202 LBS | BODY MASS INDEX: 30.62 KG/M2 | TEMPERATURE: 98.3 F | HEART RATE: 96 BPM

## 2023-12-04 PROCEDURE — 99214 OFFICE O/P EST MOD 30 MIN: CPT

## 2023-12-05 ENCOUNTER — TRANSCRIPTION ENCOUNTER (OUTPATIENT)
Age: 48
End: 2023-12-05

## 2023-12-22 ENCOUNTER — TRANSCRIPTION ENCOUNTER (OUTPATIENT)
Age: 48
End: 2023-12-22

## 2023-12-27 ENCOUNTER — NON-APPOINTMENT (OUTPATIENT)
Age: 48
End: 2023-12-27

## 2023-12-29 ENCOUNTER — TRANSCRIPTION ENCOUNTER (OUTPATIENT)
Age: 48
End: 2023-12-29

## 2023-12-29 RX ORDER — INSULIN ASPART INJECTION 100 [IU]/ML
100 INJECTION, SOLUTION SUBCUTANEOUS
Qty: 2 | Refills: 1 | Status: ACTIVE | COMMUNITY
Start: 2023-12-29 | End: 1900-01-01

## 2024-01-02 ENCOUNTER — TRANSCRIPTION ENCOUNTER (OUTPATIENT)
Age: 49
End: 2024-01-02

## 2024-01-02 RX ORDER — INSULIN LISPRO-AABC 100 [IU]/ML
100 INJECTION, SOLUTION SUBCUTANEOUS
Qty: 3 | Refills: 0 | Status: COMPLETED | COMMUNITY
Start: 2022-05-16 | End: 2024-01-02

## 2024-02-08 ENCOUNTER — TRANSCRIPTION ENCOUNTER (OUTPATIENT)
Age: 49
End: 2024-02-08

## 2024-02-09 ENCOUNTER — TRANSCRIPTION ENCOUNTER (OUTPATIENT)
Age: 49
End: 2024-02-09

## 2024-02-09 LAB
HBA1C MFR BLD HPLC: 6.7
LDLC SERPL DIRECT ASSAY-MCNC: 86
MICROALBUMIN/CREAT 24H UR-RTO: 3
TSH SERPL-ACNC: 1.64

## 2024-02-12 ENCOUNTER — APPOINTMENT (OUTPATIENT)
Dept: ENDOCRINOLOGY | Facility: CLINIC | Age: 49
End: 2024-02-12
Payer: COMMERCIAL

## 2024-02-12 VITALS
HEIGHT: 68 IN | WEIGHT: 206 LBS | DIASTOLIC BLOOD PRESSURE: 70 MMHG | BODY MASS INDEX: 31.22 KG/M2 | SYSTOLIC BLOOD PRESSURE: 110 MMHG | OXYGEN SATURATION: 98 % | HEART RATE: 87 BPM

## 2024-02-12 DIAGNOSIS — E13.9 OTHER SPECIFIED DIABETES MELLITUS W/OUT COMPLICATIONS: ICD-10-CM

## 2024-02-12 LAB — GLUCOSE BLDC GLUCOMTR-MCNC: 196

## 2024-02-12 PROCEDURE — 99214 OFFICE O/P EST MOD 30 MIN: CPT

## 2024-02-12 PROCEDURE — 95251 CONT GLUC MNTR ANALYSIS I&R: CPT

## 2024-02-12 PROCEDURE — G2211 COMPLEX E/M VISIT ADD ON: CPT

## 2024-02-12 RX ORDER — SEMAGLUTIDE 0.68 MG/ML
2 INJECTION, SOLUTION SUBCUTANEOUS
Qty: 3 | Refills: 1 | Status: ACTIVE | COMMUNITY
Start: 2023-05-23 | End: 1900-01-01

## 2024-02-12 NOTE — ASSESSMENT
[FreeTextEntry1] : DM, TAMMY, now on basal/bolus with good success, ozempic has also been helpful.    Glucose Sensor Necessity:  This patient with diabetes (dx: E13.9 ) has been performing 4 glucose checks per day for the last 60 days utilizing a home blood glucose monitor. The patient is treated with insulin via four injections dialy.  This patient requires frequent adjustments to his insulin treatment on the basis of therapeutic continuous glucose monitoring results by either adjusting fixed doses or sliding scale.  In addition, the patient has been to our office for an evaluation of his diabetes control within the past 6 months.    
Poly-Vi-Sol Drops oral liquid: 1 milliliter(s) orally once a day

## 2024-02-12 NOTE — HISTORY OF PRESENT ILLNESS
[Continuous Glucose Monitoring] : Continuous Glucose Monitoring: Yes [Deven] : Deven [FreeTextEntry1] : DM type:TAMMY  Severity:moderate  Duration:15 years  Onset:rapid weight loss and A1C of 13%   Associated symptoms or complications:none    Current regimen:   tresiba 12 units at Hs ozempic .5  fiasp 3-4 units in morning, 5-6 units before meals PRN. WOrking much better than lyumjev recent course of prednisone stopping today  Blood sugar monitorin-5 times a day,   Freestyle Deven results reviewed: a couple lows after lunch and dinner  Patient reports working out during the day causing lows after work outs, late night lows due to over correcting  Deven scanned now 153   exercise: elliptical at house, use to be a runner, weight training 3 days   No recent eye exam   diet:  Pt. reports he has not been following a low carb diet causing A1C to rise morning in protein, fish afternoon varied  dinner meat and vegetables deserts at times    Pt. presented 14 years ago with rapid weight loss and an A1C level of 13%. Prior to this was told that his blood sugar was a little high, but no action was taken. he was started on kombiglyze with rapid glucose lowering and hypoglycemia; then placed on metformin. Control was good for years, but eventually he required the addition of januvia. Diabetic control has declined, with A1C measured to a level of 10%; there has been coincident stress, and pt. has been less adherent with diet.  A1C improved to 8.4% and then 7.8%. Strongly JOSÉ MANUEL positive, with low but not absent c-peptide. Responding to glimepiride. Started on tresiba 2018.    PMH: psoriasis, on humira [FreeTextEntry2] : 71 [FreeTextEntry3] : 58 [FreeTextEntry4] : 0 [de-identified] : 7.2 [FreeTextEntry5] : 161 [FreeTextEntry6] : 29.3

## 2024-02-21 ENCOUNTER — TRANSCRIPTION ENCOUNTER (OUTPATIENT)
Age: 49
End: 2024-02-21

## 2024-02-26 ENCOUNTER — TRANSCRIPTION ENCOUNTER (OUTPATIENT)
Age: 49
End: 2024-02-26

## 2024-05-06 ENCOUNTER — TRANSCRIPTION ENCOUNTER (OUTPATIENT)
Age: 49
End: 2024-05-06

## 2024-05-08 ENCOUNTER — APPOINTMENT (OUTPATIENT)
Dept: UROLOGY | Facility: CLINIC | Age: 49
End: 2024-05-08
Payer: COMMERCIAL

## 2024-05-08 VITALS
WEIGHT: 203 LBS | HEIGHT: 68 IN | BODY MASS INDEX: 30.77 KG/M2 | SYSTOLIC BLOOD PRESSURE: 122 MMHG | DIASTOLIC BLOOD PRESSURE: 81 MMHG | OXYGEN SATURATION: 97 % | HEART RATE: 81 BPM

## 2024-05-08 DIAGNOSIS — R68.82 DECREASED LIBIDO: ICD-10-CM

## 2024-05-08 DIAGNOSIS — N52.9 MALE ERECTILE DYSFUNCTION, UNSPECIFIED: ICD-10-CM

## 2024-05-08 DIAGNOSIS — R79.89 OTHER SPECIFIED ABNORMAL FINDINGS OF BLOOD CHEMISTRY: ICD-10-CM

## 2024-05-08 PROCEDURE — 99214 OFFICE O/P EST MOD 30 MIN: CPT

## 2024-05-08 NOTE — HISTORY OF PRESENT ILLNESS
[FreeTextEntry1] : Pt comes in bc of fatigue. Pt sent by PCP for low T. Pt having decreased libido. Pt urinates well. Erections are getting weak.   11/22/23 Testosterone 258  11/30/23 Testosterone 300                 PSA 0.83  12/13/24 Testosterone 316

## 2024-05-08 NOTE — LETTER BODY
[Dear  ___] : Dear  [unfilled], [Consult Letter:] : I had the pleasure of evaluating your patient, [unfilled]. [Please see my note below.] : Please see my note below. [Consult Closing:] : Thank you very much for allowing me to participate in the care of this patient.  If you have any questions, please do not hesitate to contact me. [Sincerely,] : Sincerely, [FreeTextEntry3] : Benny Diggs, DO Genitourinary Medicine

## 2024-05-08 NOTE — ASSESSMENT
[FreeTextEntry1] : Time was spent discussing risks and benefits of starting testosterone  Plan testosterone LH PSA CBC start cialis 5mg fu 2 weeks

## 2024-05-09 RX ORDER — TADALAFIL 5 MG/1
5 TABLET ORAL
Qty: 30 | Refills: 3 | Status: ACTIVE | COMMUNITY
Start: 2024-05-08 | End: 1900-01-01

## 2024-06-26 ENCOUNTER — TRANSCRIPTION ENCOUNTER (OUTPATIENT)
Age: 49
End: 2024-06-26

## 2024-07-02 ENCOUNTER — APPOINTMENT (OUTPATIENT)
Dept: PULMONOLOGY | Facility: CLINIC | Age: 49
End: 2024-07-02

## 2024-07-03 ENCOUNTER — RX RENEWAL (OUTPATIENT)
Age: 49
End: 2024-07-03

## 2024-07-03 ENCOUNTER — TRANSCRIPTION ENCOUNTER (OUTPATIENT)
Age: 49
End: 2024-07-03

## 2024-07-11 ENCOUNTER — RX RENEWAL (OUTPATIENT)
Age: 49
End: 2024-07-11

## 2024-07-11 RX ORDER — PEN NEEDLE, DIABETIC 32GX 5/32"
32G X 4 MM NEEDLE, DISPOSABLE MISCELLANEOUS
Qty: 1 | Refills: 3 | Status: ACTIVE | COMMUNITY
Start: 2024-07-11 | End: 1900-01-01

## 2024-07-29 ENCOUNTER — RX RENEWAL (OUTPATIENT)
Age: 49
End: 2024-07-29

## 2024-08-15 ENCOUNTER — TRANSCRIPTION ENCOUNTER (OUTPATIENT)
Age: 49
End: 2024-08-15

## 2024-08-20 ENCOUNTER — RX RENEWAL (OUTPATIENT)
Age: 49
End: 2024-08-20

## 2024-08-23 LAB
HBA1C MFR BLD HPLC: 7.1
LDLC SERPL DIRECT ASSAY-MCNC: 81
TSH SERPL-ACNC: 1.58

## 2024-08-26 ENCOUNTER — APPOINTMENT (OUTPATIENT)
Dept: ENDOCRINOLOGY | Facility: CLINIC | Age: 49
End: 2024-08-26
Payer: COMMERCIAL

## 2024-08-26 VITALS
WEIGHT: 206 LBS | HEART RATE: 84 BPM | SYSTOLIC BLOOD PRESSURE: 126 MMHG | DIASTOLIC BLOOD PRESSURE: 80 MMHG | BODY MASS INDEX: 31.22 KG/M2 | OXYGEN SATURATION: 96 % | HEIGHT: 68 IN

## 2024-08-26 DIAGNOSIS — E13.9 OTHER SPECIFIED DIABETES MELLITUS W/OUT COMPLICATIONS: ICD-10-CM

## 2024-08-26 LAB — GLUCOSE BLDC GLUCOMTR-MCNC: 213

## 2024-08-26 PROCEDURE — G2211 COMPLEX E/M VISIT ADD ON: CPT | Mod: NC

## 2024-08-26 PROCEDURE — 95251 CONT GLUC MNTR ANALYSIS I&R: CPT

## 2024-08-26 PROCEDURE — 82962 GLUCOSE BLOOD TEST: CPT

## 2024-08-26 PROCEDURE — 99214 OFFICE O/P EST MOD 30 MIN: CPT

## 2024-08-26 NOTE — HISTORY OF PRESENT ILLNESS
[Continuous Glucose Monitoring] : Continuous Glucose Monitoring: Yes [Deven] : Deven [FreeTextEntry1] : DM type:TAMMY  Severity:moderate  Duration:15 years  Onset:rapid weight loss and A1C of 13%   Associated symptoms or complications:none    Current regimen:   tresiba 12 units at Hs ozempic .5  fiasp 3-4 units in morning, 5-6 units before meals PRN. WOrking much better than lyumjev recent course of prednisone stopping today  Blood sugar monitorin-5 times a day,   Freestyle Deven results reviewed: a couple lows after lunch and dinner  Patient reports working out during the day causing lows after work outs, late night lows due to over correcting  Deven scanned now 153   exercise: elliptical at house, use to be a runner, weight training 3 days   No recent eye exam   diet:  Pt. reports he has not been following a low carb diet causing A1C to rise morning in protein, fish afternoon varied  dinner meat and vegetables deserts at times    Pt. presented 15years ago with rapid weight loss and an A1C level of 13%. Prior to this was told that his blood sugar was a little high, but no action was taken. he was started on kombiglyze with rapid glucose lowering and hypoglycemia; then placed on metformin. Control was good for years, but eventually he required the addition of januvia. Diabetic control has declined, with A1C measured to a level of 10%; there has been coincident stress, and pt. has been less adherent with diet.  A1C improved to 8.4% and then 7.8%. Strongly JOSÉ MANUEL positive, with low but not absent c-peptide. Responding to glimepiride. Started on tresiba 2018.    PMH: psoriasis, on humira [FreeTextEntry2] : 84 [FreeTextEntry3] : 16 [FreeTextEntry4] : 0 [de-identified] : 6.6 [FreeTextEntry5] : 136 [FreeTextEntry6] : 31.8

## 2024-08-26 NOTE — ASSESSMENT
[FreeTextEntry1] : DM, TAMMY, now on basal/bolus with good success, ozempic has also been helpful.    Glucose Sensor Necessity:  This patient with diabetes (dx: E13.9 ) has been performing 4 glucose checks per day for the last 60 days utilizing a home blood glucose monitor. The patient is treated with insulin via four injections dialy.  This patient requires frequent adjustments to his insulin treatment on the basis of therapeutic continuous glucose monitoring results by either adjusting fixed doses or sliding scale.  In addition, the patient has been to our office for an evaluation of his diabetes control within the past 6 months.

## 2024-10-08 ENCOUNTER — RX RENEWAL (OUTPATIENT)
Age: 49
End: 2024-10-08

## 2024-10-22 ENCOUNTER — RX RENEWAL (OUTPATIENT)
Age: 49
End: 2024-10-22

## 2024-10-23 ENCOUNTER — TRANSCRIPTION ENCOUNTER (OUTPATIENT)
Age: 49
End: 2024-10-23

## 2025-03-17 ENCOUNTER — TRANSCRIPTION ENCOUNTER (OUTPATIENT)
Age: 50
End: 2025-03-17

## 2025-04-01 ENCOUNTER — TRANSCRIPTION ENCOUNTER (OUTPATIENT)
Age: 50
End: 2025-04-01

## 2025-04-14 ENCOUNTER — NON-APPOINTMENT (OUTPATIENT)
Age: 50
End: 2025-04-14

## 2025-04-14 ENCOUNTER — APPOINTMENT (OUTPATIENT)
Dept: ENDOCRINOLOGY | Facility: CLINIC | Age: 50
End: 2025-04-14
Payer: COMMERCIAL

## 2025-04-14 VITALS
BODY MASS INDEX: 31.07 KG/M2 | DIASTOLIC BLOOD PRESSURE: 76 MMHG | HEIGHT: 68 IN | SYSTOLIC BLOOD PRESSURE: 120 MMHG | OXYGEN SATURATION: 98 % | WEIGHT: 205 LBS | HEART RATE: 81 BPM

## 2025-04-14 DIAGNOSIS — E13.9 OTHER SPECIFIED DIABETES MELLITUS W/OUT COMPLICATIONS: ICD-10-CM

## 2025-04-14 LAB
GLUCOSE BLDC GLUCOMTR-MCNC: 142
HBA1C MFR BLD HPLC: 7
LDLC SERPL DIRECT ASSAY-MCNC: 76
MICROALBUMIN/CREAT 24H UR-RTO: 2
TSH SERPL-ACNC: 1.3

## 2025-04-14 PROCEDURE — 82962 GLUCOSE BLOOD TEST: CPT

## 2025-04-14 PROCEDURE — G2211 COMPLEX E/M VISIT ADD ON: CPT | Mod: NC

## 2025-04-14 PROCEDURE — 95251 CONT GLUC MNTR ANALYSIS I&R: CPT

## 2025-04-14 PROCEDURE — 99214 OFFICE O/P EST MOD 30 MIN: CPT

## 2025-05-29 ENCOUNTER — TRANSCRIPTION ENCOUNTER (OUTPATIENT)
Age: 50
End: 2025-05-29

## 2025-06-13 ENCOUNTER — TRANSCRIPTION ENCOUNTER (OUTPATIENT)
Age: 50
End: 2025-06-13

## 2025-06-13 ENCOUNTER — APPOINTMENT (OUTPATIENT)
Dept: ENDOCRINOLOGY | Facility: CLINIC | Age: 50
End: 2025-06-13
Payer: COMMERCIAL

## 2025-06-13 VITALS
HEART RATE: 87 BPM | DIASTOLIC BLOOD PRESSURE: 80 MMHG | BODY MASS INDEX: 30.92 KG/M2 | WEIGHT: 204 LBS | OXYGEN SATURATION: 98 % | SYSTOLIC BLOOD PRESSURE: 120 MMHG | HEIGHT: 68 IN

## 2025-06-13 LAB
GLUCOSE BLDC GLUCOMTR-MCNC: 152
HBA1C MFR BLD HPLC: 7.2

## 2025-06-13 PROCEDURE — 99214 OFFICE O/P EST MOD 30 MIN: CPT

## 2025-06-13 PROCEDURE — 82962 GLUCOSE BLOOD TEST: CPT

## 2025-06-13 PROCEDURE — 95251 CONT GLUC MNTR ANALYSIS I&R: CPT

## 2025-06-13 PROCEDURE — 83036 HEMOGLOBIN GLYCOSYLATED A1C: CPT | Mod: QW

## 2025-06-17 ENCOUNTER — TRANSCRIPTION ENCOUNTER (OUTPATIENT)
Age: 50
End: 2025-06-17

## 2025-06-17 ENCOUNTER — OUTPATIENT (OUTPATIENT)
Dept: OUTPATIENT SERVICES | Facility: HOSPITAL | Age: 50
LOS: 1 days | End: 2025-06-17
Payer: COMMERCIAL

## 2025-06-17 VITALS
TEMPERATURE: 98 F | SYSTOLIC BLOOD PRESSURE: 133 MMHG | WEIGHT: 204.15 LBS | DIASTOLIC BLOOD PRESSURE: 77 MMHG | RESPIRATION RATE: 18 BRPM | HEIGHT: 68 IN | OXYGEN SATURATION: 100 % | HEART RATE: 85 BPM

## 2025-06-17 DIAGNOSIS — Z98.52 VASECTOMY STATUS: Chronic | ICD-10-CM

## 2025-06-17 DIAGNOSIS — Z98.890 OTHER SPECIFIED POSTPROCEDURAL STATES: Chronic | ICD-10-CM

## 2025-06-17 DIAGNOSIS — Z01.818 ENCOUNTER FOR OTHER PREPROCEDURAL EXAMINATION: ICD-10-CM

## 2025-06-17 LAB
ANION GAP SERPL CALC-SCNC: 5 MMOL/L — SIGNIFICANT CHANGE UP (ref 5–17)
BASOPHILS # BLD AUTO: 0.04 K/UL — SIGNIFICANT CHANGE UP (ref 0–0.2)
BASOPHILS NFR BLD AUTO: 0.6 % — SIGNIFICANT CHANGE UP (ref 0–2)
BLD GP AB SCN SERPL QL: SIGNIFICANT CHANGE UP
BUN SERPL-MCNC: 17 MG/DL — SIGNIFICANT CHANGE UP (ref 7–23)
CALCIUM SERPL-MCNC: 8.9 MG/DL — SIGNIFICANT CHANGE UP (ref 8.5–10.1)
CHLORIDE SERPL-SCNC: 107 MMOL/L — SIGNIFICANT CHANGE UP (ref 96–108)
CO2 SERPL-SCNC: 25 MMOL/L — SIGNIFICANT CHANGE UP (ref 22–31)
CREAT SERPL-MCNC: 1.17 MG/DL — SIGNIFICANT CHANGE UP (ref 0.5–1.3)
EGFR: 77 ML/MIN/1.73M2 — SIGNIFICANT CHANGE UP
EGFR: 77 ML/MIN/1.73M2 — SIGNIFICANT CHANGE UP
EOSINOPHIL # BLD AUTO: 0.39 K/UL — SIGNIFICANT CHANGE UP (ref 0–0.5)
EOSINOPHIL NFR BLD AUTO: 5.8 % — SIGNIFICANT CHANGE UP (ref 0–6)
GLUCOSE SERPL-MCNC: 166 MG/DL — HIGH (ref 70–99)
HCT VFR BLD CALC: 42.4 % — SIGNIFICANT CHANGE UP (ref 39–50)
HGB BLD-MCNC: 14.5 G/DL — SIGNIFICANT CHANGE UP (ref 13–17)
IMM GRANULOCYTES # BLD AUTO: 0.02 K/UL — SIGNIFICANT CHANGE UP (ref 0–0.07)
IMM GRANULOCYTES NFR BLD AUTO: 0.3 % — SIGNIFICANT CHANGE UP (ref 0–0.9)
LYMPHOCYTES # BLD AUTO: 2.11 K/UL — SIGNIFICANT CHANGE UP (ref 1–3.3)
LYMPHOCYTES NFR BLD AUTO: 31.4 % — SIGNIFICANT CHANGE UP (ref 13–44)
MCHC RBC-ENTMCNC: 29.5 PG — SIGNIFICANT CHANGE UP (ref 27–34)
MCHC RBC-ENTMCNC: 34.2 G/DL — SIGNIFICANT CHANGE UP (ref 32–36)
MCV RBC AUTO: 86.2 FL — SIGNIFICANT CHANGE UP (ref 80–100)
MONOCYTES # BLD AUTO: 0.54 K/UL — SIGNIFICANT CHANGE UP (ref 0–0.9)
MONOCYTES NFR BLD AUTO: 8 % — SIGNIFICANT CHANGE UP (ref 2–14)
NEUTROPHILS # BLD AUTO: 3.63 K/UL — SIGNIFICANT CHANGE UP (ref 1.8–7.4)
NEUTROPHILS NFR BLD AUTO: 53.9 % — SIGNIFICANT CHANGE UP (ref 43–77)
NRBC # BLD AUTO: 0 K/UL — SIGNIFICANT CHANGE UP (ref 0–0)
NRBC # FLD: 0 K/UL — SIGNIFICANT CHANGE UP (ref 0–0)
NRBC BLD AUTO-RTO: 0 /100 WBCS — SIGNIFICANT CHANGE UP (ref 0–0)
PLATELET # BLD AUTO: 298 K/UL — SIGNIFICANT CHANGE UP (ref 150–400)
PMV BLD: 9.4 FL — SIGNIFICANT CHANGE UP (ref 7–13)
POTASSIUM SERPL-MCNC: 4.1 MMOL/L — SIGNIFICANT CHANGE UP (ref 3.5–5.3)
POTASSIUM SERPL-SCNC: 4.1 MMOL/L — SIGNIFICANT CHANGE UP (ref 3.5–5.3)
RBC # BLD: 4.92 M/UL — SIGNIFICANT CHANGE UP (ref 4.2–5.8)
RBC # FLD: 12.7 % — SIGNIFICANT CHANGE UP (ref 10.3–14.5)
SODIUM SERPL-SCNC: 137 MMOL/L — SIGNIFICANT CHANGE UP (ref 135–145)
WBC # BLD: 6.73 K/UL — SIGNIFICANT CHANGE UP (ref 3.8–10.5)
WBC # FLD AUTO: 6.73 K/UL — SIGNIFICANT CHANGE UP (ref 3.8–10.5)

## 2025-06-17 PROCEDURE — 80048 BASIC METABOLIC PNL TOTAL CA: CPT

## 2025-06-17 PROCEDURE — 93005 ELECTROCARDIOGRAM TRACING: CPT

## 2025-06-17 PROCEDURE — 93010 ELECTROCARDIOGRAM REPORT: CPT

## 2025-06-17 PROCEDURE — 83036 HEMOGLOBIN GLYCOSYLATED A1C: CPT

## 2025-06-17 PROCEDURE — 86900 BLOOD TYPING SEROLOGIC ABO: CPT

## 2025-06-17 PROCEDURE — 99214 OFFICE O/P EST MOD 30 MIN: CPT | Mod: 25

## 2025-06-17 PROCEDURE — 36415 COLL VENOUS BLD VENIPUNCTURE: CPT

## 2025-06-17 PROCEDURE — 86850 RBC ANTIBODY SCREEN: CPT

## 2025-06-17 PROCEDURE — 86901 BLOOD TYPING SEROLOGIC RH(D): CPT

## 2025-06-17 PROCEDURE — 85025 COMPLETE CBC W/AUTO DIFF WBC: CPT

## 2025-06-17 NOTE — H&P PST ADULT - NSANTHOSAYNRD_GEN_A_CORE
No. MITA screening performed.  STOP BANG Legend: 0-2 = LOW Risk; 3-4 = INTERMEDIATE Risk; 5-8 = HIGH Risk

## 2025-06-17 NOTE — H&P PST ADULT - NSICDXPASTMEDICALHX_GEN_ALL_CORE_FT
PAST MEDICAL HISTORY:  Contracture of finger joint     DM I (diabetes mellitus, type I)     Psoriasis

## 2025-06-17 NOTE — H&P PST ADULT - ASSESSMENT
50 y/o male with left ring finger contracture, his PMH includes DM1, psoriasis. He is scheduled for Left ring finger joint contracture release, flexor pulley reconstruction with palmaris longus autograft.     Plan  1. Stop all NSAIDS, herbal supplements and vitamins for 7 days.  2. NPO as per ASU instructions  3. Take the following medications ( none ) with small sips of water on the morning of your procedure/surgery.  4. Use EZ sponges as directed  5.  Labs, EKG done in PST   6. PMD/endocrinologist visit for optimization prior to surgery as per surgeon.  7. Preop insulin instructions as per Endocrinologist.           48 y/o male with left ring finger contracture, his PMH includes DM1, psoriasis. He is scheduled for Left ring finger joint contracture release, flexor pulley reconstruction with palmaris longus autograft.     Plan  1. Stop all NSAIDS, herbal supplements and vitamins for 7 days.  2. NPO as per ASU instructions  3. Take the following medications ( none ) with small sips of water on the morning of your procedure/surgery.  4. Use EZ sponges as directed  5.  Labs, EKG done in PST   6. PMD/endocrinologist visit for optimization prior to surgery as per surgeon.  7. Preop insulin instructions as per Endocrinologist.  8. Ozempic last dose 06/05/2025

## 2025-06-17 NOTE — H&P PST ADULT - NSICDXFAMILYHX_GEN_ALL_CORE_FT
FAMILY HISTORY:  Grandparent  Still living? Unknown  Family history of heart disease, Age at diagnosis: Age Unknown  FH: HTN (hypertension), Age at diagnosis: Age Unknown

## 2025-06-17 NOTE — H&P PST ADULT - HISTORY OF PRESENT ILLNESS
50 y/o male with left ring finger contracture, hi s PMH includes DM1, psoriasis. Pt reports injury to his left ring finger on 05/2025, he c/o of contracture, he denies pain. He is here for PST for planned Left ring finger joint contracture release, flexor pulley reconstruction with palmaris longus autograft.

## 2025-06-18 ENCOUNTER — APPOINTMENT (OUTPATIENT)
Dept: UROLOGY | Facility: CLINIC | Age: 50
End: 2025-06-18

## 2025-06-18 DIAGNOSIS — Z01.818 ENCOUNTER FOR OTHER PREPROCEDURAL EXAMINATION: ICD-10-CM

## 2025-06-18 LAB
A1C WITH ESTIMATED AVERAGE GLUCOSE RESULT: 7.1 % — HIGH (ref 4–5.6)
ESTIMATED AVERAGE GLUCOSE: 157 MG/DL — HIGH (ref 68–114)

## 2025-06-19 ENCOUNTER — TRANSCRIPTION ENCOUNTER (OUTPATIENT)
Age: 50
End: 2025-06-19

## 2025-06-19 ENCOUNTER — OUTPATIENT (OUTPATIENT)
Dept: INPATIENT UNIT | Facility: HOSPITAL | Age: 50
LOS: 1 days | Discharge: HOME | End: 2025-06-19
Payer: COMMERCIAL

## 2025-06-19 VITALS
OXYGEN SATURATION: 98 % | SYSTOLIC BLOOD PRESSURE: 122 MMHG | DIASTOLIC BLOOD PRESSURE: 65 MMHG | HEART RATE: 74 BPM | TEMPERATURE: 97 F | RESPIRATION RATE: 16 BRPM

## 2025-06-19 VITALS
HEIGHT: 68 IN | OXYGEN SATURATION: 99 % | SYSTOLIC BLOOD PRESSURE: 102 MMHG | DIASTOLIC BLOOD PRESSURE: 64 MMHG | HEART RATE: 72 BPM | RESPIRATION RATE: 16 BRPM | WEIGHT: 204.15 LBS | TEMPERATURE: 97 F

## 2025-06-19 DIAGNOSIS — S66.115A: ICD-10-CM

## 2025-06-19 DIAGNOSIS — E66.9 OBESITY, UNSPECIFIED: ICD-10-CM

## 2025-06-19 DIAGNOSIS — E10.9 TYPE 1 DIABETES MELLITUS WITHOUT COMPLICATIONS: ICD-10-CM

## 2025-06-19 DIAGNOSIS — L40.9 PSORIASIS, UNSPECIFIED: ICD-10-CM

## 2025-06-19 DIAGNOSIS — K21.9 GASTRO-ESOPHAGEAL REFLUX DISEASE WITHOUT ESOPHAGITIS: ICD-10-CM

## 2025-06-19 DIAGNOSIS — Z98.52 VASECTOMY STATUS: Chronic | ICD-10-CM

## 2025-06-19 DIAGNOSIS — Z98.890 OTHER SPECIFIED POSTPROCEDURAL STATES: Chronic | ICD-10-CM

## 2025-06-19 DIAGNOSIS — Z79.85 LONG-TERM (CURRENT) USE OF INJECTABLE NON-INSULIN ANTIDIABETIC DRUGS: ICD-10-CM
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
## 2025-06-19 DIAGNOSIS — M25.642 STIFFNESS OF LEFT HAND, NOT ELSEWHERE CLASSIFIED: ICD-10-CM
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
## 2025-06-19 DIAGNOSIS — Z88.1 ALLERGY STATUS TO OTHER ANTIBIOTIC AGENTS: ICD-10-CM

## 2025-06-19 LAB
ABO RH CONFIRMATION: SIGNIFICANT CHANGE UP
GLUCOSE BLDC GLUCOMTR-MCNC: 171 MG/DL — HIGH (ref 70–99)

## 2025-06-19 PROCEDURE — 36415 COLL VENOUS BLD VENIPUNCTURE: CPT

## 2025-06-19 PROCEDURE — 82962 GLUCOSE BLOOD TEST: CPT

## 2025-06-19 RX ORDER — INSULIN ASPART 100 [IU]/ML
0 INJECTION, SOLUTION INTRAVENOUS; SUBCUTANEOUS
Refills: 0 | DISCHARGE

## 2025-06-19 RX ORDER — ONDANSETRON HCL/PF 4 MG/2 ML
1 VIAL (ML) INJECTION
Qty: 10 | Refills: 0
Start: 2025-06-19

## 2025-06-19 RX ORDER — DOCUSATE SODIUM 100 MG
1 CAPSULE ORAL
Qty: 20 | Refills: 0
Start: 2025-06-19

## 2025-06-19 RX ORDER — ADALIMUMAB-ADAZ 40 MG/.4ML
40 INJECTION, SOLUTION SUBCUTANEOUS
Refills: 0 | DISCHARGE

## 2025-06-19 RX ORDER — OXYCODONE HYDROCHLORIDE 30 MG/1
5 TABLET ORAL ONCE
Refills: 0 | Status: DISCONTINUED | OUTPATIENT
Start: 2025-06-19 | End: 2025-06-19

## 2025-06-19 RX ORDER — OXYCODONE HYDROCHLORIDE 30 MG/1
1 TABLET ORAL
Qty: 12 | Refills: 0
Start: 2025-06-19 | End: 2025-06-21

## 2025-06-19 RX ORDER — INSULIN DEGLUDEC 100 U/ML
12 INJECTION, SOLUTION SUBCUTANEOUS
Refills: 0 | DISCHARGE

## 2025-06-19 RX ORDER — SODIUM CHLORIDE 9 G/1000ML
1000 INJECTION, SOLUTION INTRAVENOUS
Refills: 0 | Status: DISCONTINUED | OUTPATIENT
Start: 2025-06-19 | End: 2025-06-19

## 2025-06-19 RX ORDER — FENTANYL CITRATE-0.9 % NACL/PF 100MCG/2ML
50 SYRINGE (ML) INTRAVENOUS
Refills: 0 | Status: DISCONTINUED | OUTPATIENT
Start: 2025-06-19 | End: 2025-06-19

## 2025-06-19 RX ORDER — ONDANSETRON HCL/PF 4 MG/2 ML
4 VIAL (ML) INJECTION ONCE
Refills: 0 | Status: DISCONTINUED | OUTPATIENT
Start: 2025-06-19 | End: 2025-06-19

## 2025-06-19 RX ORDER — SEMAGLUTIDE 1 MG/.5ML
0.5 INJECTION, SOLUTION SUBCUTANEOUS
Refills: 0 | DISCHARGE

## 2025-06-19 NOTE — ASU DISCHARGE PLAN (ADULT/PEDIATRIC) - FINANCIAL ASSISTANCE
[facility] provides services at a reduced cost to those who are determined to be eligible through [facility]’s financial assistance program. Information regarding [facility]’s financial assistance program can be found by going to [email or web address] or by calling [phone].

## 2025-06-19 NOTE — ASU DISCHARGE PLAN (ADULT/PEDIATRIC) - CARE PROVIDER_API CALL
Jessica Oneill  Orthopaedic Surgery  [address] [place] [id number]  Phone: [phone]  Fax: [phone]  Follow Up Time:

## 2025-06-19 NOTE — ASU DISCHARGE PLAN (ADULT/PEDIATRIC) - ASU DC SPECIAL INSTRUCTIONSFT
1. Keep bandage on for 5 days. Then you can remove and get it wet. Otherwise cover hand with plastic bag for showering.    2. If you have a splint on, keep it on until you see Dr. Oneill in the office. Do not get the splint wet at all.    3. Elevate hand as much as possible and wiggle fingers as much as you can, as often as you think of it (wiggle 10 times every commercial  if you are watching TV, or reading a book after every 5 pages read). The exception is if you have a splint you will not be able to wiggle the affected digit. Try to wiggle the free ones though.    4. Walk plenty, no sitting around.    5. See Dr. Oneill in the office in about 7-10 days. Call to schedule. You will have you wound checked then, any sutures will be removed, and your splint (if you have one on) will be changed.

## 2025-06-19 NOTE — ASU DISCHARGE PLAN (ADULT/PEDIATRIC) - NS MD DC FALL RISK RISK
For information on Fall & Injury Prevention, visit: [email or web address] OR  [email or web address] OR  https://www.cdc.gov/steadi/patient.html

## 2025-06-19 NOTE — ASU PATIENT PROFILE, ADULT - NSICDXPASTMEDICALHX_GEN_ALL_CORE_FT
PAST MEDICAL HISTORY:  Contracture of finger joint left    DM I (diabetes mellitus, type I)     Psoriasis

## 2025-07-15 RX ORDER — BLOOD-GLUCOSE,RECEIVER,CONT
EACH MISCELLANEOUS
Qty: 1 | Refills: 0 | Status: ACTIVE | COMMUNITY
Start: 2025-07-15 | End: 1900-01-01

## 2025-08-20 ENCOUNTER — TRANSCRIPTION ENCOUNTER (OUTPATIENT)
Age: 50
End: 2025-08-20